# Patient Record
Sex: FEMALE | Race: WHITE | NOT HISPANIC OR LATINO | Employment: FULL TIME | URBAN - METROPOLITAN AREA
[De-identification: names, ages, dates, MRNs, and addresses within clinical notes are randomized per-mention and may not be internally consistent; named-entity substitution may affect disease eponyms.]

---

## 2018-01-31 ENCOUNTER — APPOINTMENT (EMERGENCY)
Dept: CT IMAGING | Facility: HOSPITAL | Age: 48
End: 2018-01-31
Payer: COMMERCIAL

## 2018-01-31 ENCOUNTER — HOSPITAL ENCOUNTER (EMERGENCY)
Facility: HOSPITAL | Age: 48
Discharge: HOME | End: 2018-01-31
Attending: EMERGENCY MEDICINE
Payer: COMMERCIAL

## 2018-01-31 VITALS
DIASTOLIC BLOOD PRESSURE: 89 MMHG | RESPIRATION RATE: 16 BRPM | SYSTOLIC BLOOD PRESSURE: 149 MMHG | OXYGEN SATURATION: 100 % | TEMPERATURE: 98.2 F | HEART RATE: 75 BPM

## 2018-01-31 DIAGNOSIS — R53.83 FATIGUE: ICD-10-CM

## 2018-01-31 DIAGNOSIS — R51.9 PRESSURE IN HEAD: Primary | ICD-10-CM

## 2018-01-31 DIAGNOSIS — I49.3 SYMPTOMATIC PVCS: ICD-10-CM

## 2018-01-31 LAB
ALBUMIN SERPL BCP-MCNC: 3.8 G/DL (ref 3.5–5)
ALP SERPL-CCNC: 55 U/L (ref 46–116)
ALT SERPL W P-5'-P-CCNC: 20 U/L (ref 12–78)
ANION GAP SERPL CALCULATED.3IONS-SCNC: 7 MMOL/L (ref 4–13)
AST SERPL W P-5'-P-CCNC: 13 U/L (ref 5–45)
BASOPHILS # BLD AUTO: 0.01 THOUSANDS/ΜL (ref 0–0.1)
BASOPHILS NFR BLD AUTO: 0 % (ref 0–1)
BILIRUB SERPL-MCNC: 0.5 MG/DL (ref 0.2–1)
BUN SERPL-MCNC: 10 MG/DL (ref 5–25)
CALCIUM SERPL-MCNC: 9.1 MG/DL (ref 8.3–10.1)
CHLORIDE SERPL-SCNC: 106 MMOL/L (ref 100–108)
CO2 SERPL-SCNC: 28 MMOL/L (ref 21–32)
CREAT SERPL-MCNC: 0.94 MG/DL (ref 0.6–1.3)
EOSINOPHIL # BLD AUTO: 0.05 THOUSAND/ΜL (ref 0–0.61)
EOSINOPHIL NFR BLD AUTO: 1 % (ref 0–6)
ERYTHROCYTE [DISTWIDTH] IN BLOOD BY AUTOMATED COUNT: 12.1 % (ref 11.6–15.1)
GFR SERPL CREATININE-BSD FRML MDRD: 72 ML/MIN/1.73SQ M
GLUCOSE SERPL-MCNC: 95 MG/DL (ref 65–140)
HCT VFR BLD AUTO: 41.2 % (ref 34.8–46.1)
HGB BLD-MCNC: 14.2 G/DL (ref 11.5–15.4)
LYMPHOCYTES # BLD AUTO: 1.65 THOUSANDS/ΜL (ref 0.6–4.47)
LYMPHOCYTES NFR BLD AUTO: 30 % (ref 14–44)
MCH RBC QN AUTO: 30 PG (ref 26.8–34.3)
MCHC RBC AUTO-ENTMCNC: 34.5 G/DL (ref 31.4–37.4)
MCV RBC AUTO: 87 FL (ref 82–98)
MONOCYTES # BLD AUTO: 0.47 THOUSAND/ΜL (ref 0.17–1.22)
MONOCYTES NFR BLD AUTO: 9 % (ref 4–12)
NEUTROPHILS # BLD AUTO: 3.33 THOUSANDS/ΜL (ref 1.85–7.62)
NEUTS SEG NFR BLD AUTO: 60 % (ref 43–75)
PLATELET # BLD AUTO: 191 THOUSANDS/UL (ref 149–390)
PMV BLD AUTO: 10.1 FL (ref 8.9–12.7)
POTASSIUM SERPL-SCNC: 4.5 MMOL/L (ref 3.5–5.3)
PROT SERPL-MCNC: 7.5 G/DL (ref 6.4–8.2)
RBC # BLD AUTO: 4.74 MILLION/UL (ref 3.81–5.12)
SODIUM SERPL-SCNC: 141 MMOL/L (ref 136–145)
TROPONIN I SERPL-MCNC: <0.02 NG/ML
TSH SERPL DL<=0.05 MIU/L-ACNC: 2.42 UIU/ML (ref 0.36–3.74)
WBC # BLD AUTO: 5.51 THOUSAND/UL (ref 4.31–10.16)

## 2018-01-31 PROCEDURE — 70450 CT HEAD/BRAIN W/O DYE: CPT

## 2018-01-31 PROCEDURE — 84484 ASSAY OF TROPONIN QUANT: CPT | Performed by: EMERGENCY MEDICINE

## 2018-01-31 PROCEDURE — 99284 EMERGENCY DEPT VISIT MOD MDM: CPT

## 2018-01-31 PROCEDURE — 85025 COMPLETE CBC W/AUTO DIFF WBC: CPT | Performed by: EMERGENCY MEDICINE

## 2018-01-31 PROCEDURE — 93005 ELECTROCARDIOGRAM TRACING: CPT

## 2018-01-31 PROCEDURE — 80053 COMPREHEN METABOLIC PANEL: CPT | Performed by: EMERGENCY MEDICINE

## 2018-01-31 PROCEDURE — 84443 ASSAY THYROID STIM HORMONE: CPT | Performed by: EMERGENCY MEDICINE

## 2018-01-31 PROCEDURE — 96360 HYDRATION IV INFUSION INIT: CPT

## 2018-01-31 PROCEDURE — 36415 COLL VENOUS BLD VENIPUNCTURE: CPT

## 2018-01-31 RX ADMIN — SODIUM CHLORIDE 1000 ML: 0.9 INJECTION, SOLUTION INTRAVENOUS at 18:27

## 2018-01-31 NOTE — ED NOTES
Patient asymptomatic (not dizzy) with orthostatic blood pressures     [id number] [facility] 28, RN  01/31/18 9820

## 2018-01-31 NOTE — ED PROVIDER NOTES
History  Chief Complaint   Patient presents with    Dizziness     Pt reports lighthead feeling following a head cold last week, reports "lightheaded" feeling started saturday  Pt denies sizziness, and reports occasional palpitations, Pt reports headache and "head pressure" since head cold  49year old female presents for evaluation of feeling lightheaded  She denies feeling a spinning sensation and does not feel like she is going to faint  She has been feeling head pressure and HA since having a "head cold" (nasal congestion/sinus pressure) last week  No fever or chills  She also has been having palpitations  No chest pain  No SOB  NO n/v/d  She reports poor appetite and feels dehydrated  She feels very fatigued but has been getting adequate sleep  No dysuria or hematuria  No numbness or weakness  She was evaluated at urgent care a few days ago for her cold sx and was given cough medicine and prednisone and she was not sure if the lightheadedness/palpitations were secondary to prednisone        History provided by:  Patient and medical records   used: No    Dizziness   Quality:  Lightheadedness  Severity:  Moderate  Onset quality:  Gradual  Timing:  Constant  Progression:  Unchanged  Chronicity:  Recurrent  Context: physical activity and standing up    Relieved by:  Nothing  Worsened by:  Nothing  Ineffective treatments:  None tried  Associated symptoms: headaches and palpitations    Associated symptoms: no blood in stool, no chest pain, no shortness of breath, no tinnitus, no vision changes and no vomiting    Risk factors: new medications (prednisone)    Risk factors: no hx of vertigo        None       Past Medical History:   Diagnosis Date    Low blood pressure        History reviewed  No pertinent surgical history  History reviewed  No pertinent family history  I have reviewed and agree with the history as documented      Social History   Substance Use Topics    Smoking status: Never Smoker    Smokeless tobacco: Not on file    Alcohol use Yes      Comment: SOCIAL        Review of Systems   Constitutional: Positive for fatigue  HENT: Positive for sinus pressure  Negative for tinnitus  Respiratory: Negative for shortness of breath  Cardiovascular: Positive for palpitations  Negative for chest pain  Gastrointestinal: Negative for blood in stool and vomiting  Skin: Positive for pallor  Neurological: Positive for dizziness and headaches  All other systems reviewed and are negative  Physical Exam  ED Triage Vitals [01/31/18 1329]   Temperature Pulse Respirations Blood Pressure SpO2   98 2 °F (36 8 °C) 76 18 138/89 98 %      Temp Source Heart Rate Source Patient Position - Orthostatic VS BP Location FiO2 (%)   Oral Monitor Sitting Left arm --      Pain Score       7           Orthostatic Vital Signs  Vitals:    01/31/18 1828 01/31/18 1829 01/31/18 1830 01/31/18 1942   BP: 134/92 133/85 129/85 149/89   Pulse: 71 72 72 75   Patient Position - Orthostatic VS: Sitting Standing Sitting Lying       Physical Exam   Constitutional: She is oriented to person, place, and time  She appears well-developed and well-nourished  HENT:   Head: Normocephalic  Nose: Nose normal    Mouth/Throat: Oropharynx is clear and moist  No oropharyngeal exudate  Eyes: Conjunctivae and EOM are normal  Pupils are equal, round, and reactive to light  Neck: Normal range of motion  Neck supple  Cardiovascular: Normal rate, regular rhythm, normal heart sounds and intact distal pulses  Occasional extrasystoles are present  No murmur heard  Pulmonary/Chest: Effort normal and breath sounds normal    Abdominal: Soft  Bowel sounds are normal  She exhibits no distension  There is no tenderness  There is no rebound and no guarding  Musculoskeletal: Normal range of motion  She exhibits no edema, tenderness or deformity  Lymphadenopathy:     She has no cervical adenopathy  Neurological: She is alert and oriented to person, place, and time  She has normal strength and normal reflexes  No cranial nerve deficit or sensory deficit  She exhibits normal muscle tone  Coordination and gait normal    Skin: Skin is warm, dry and intact  No rash noted  Psychiatric: She has a normal mood and affect  Her behavior is normal  Judgment and thought content normal        ED Medications  Medications   sodium chloride 0 9 % bolus 1,000 mL (0 mL Intravenous Stopped 1/31/18 1944)       Diagnostic Studies  Results Reviewed     Procedure Component Value Units Date/Time    Troponin I [[id number]]  (Normal) Collected:  01/31/18 1826    Lab Status:  Final result Specimen:  Blood from Arm, Right Updated:  01/31/18 1849     Troponin I <0 02 ng/mL     Narrative:         [employer] Chemistry analyzer 99% cutoff is > 0 04 ng/mL in network labs    o cTnI 99% cutoff is useful only when applied to patients in the clinical setting of myocardial ischemia  o cTnI 99% cutoff should be interpreted in the context of clinical history, ECG findings and possibly cardiac imaging to establish correct diagnosis  o cTnI 99% cutoff may be suggestive but clearly not indicative of a coronary event without the clinical setting of myocardial ischemia  TSH [[id number]]  (Normal) Collected:  01/31/18 1412    Lab Status:  Final result Specimen:  Blood from Arm, Right Updated:  01/31/18 1841     TSH 3RD GENERATON 2 422 uIU/mL     Narrative:         Patients undergoing fluorescein dye angiography may retain small amounts of fluorescein in the body for 48-72 hours post procedure  Samples containing fluorescein can produce falsely depressed TSH values  If the patient had this procedure,a specimen should be resubmitted post fluorescein clearance            The recommended reference ranges for TSH during pregnancy are as follows:  First trimester 0 1 to 2 5 uIU/mL  Second trimester  0 2 to 3 0 uIU/mL  Third trimester 0 3 to 3 0 uIU/m Comprehensive metabolic panel [[id number]] Collected:  01/31/18 1412    Lab Status:  Final result Specimen:  Blood from Arm, Right Updated:  01/31/18 1434     Sodium 141 mmol/L      Potassium 4 5 mmol/L      Chloride 106 mmol/L      CO2 28 mmol/L      Anion Gap 7 mmol/L      BUN 10 mg/dL      Creatinine 0 94 mg/dL      Glucose 95 mg/dL      Calcium 9 1 mg/dL      AST 13 U/L      ALT 20 U/L      Alkaline Phosphatase 55 U/L      Total Protein 7 5 g/dL      Albumin 3 8 g/dL      Total Bilirubin 0 50 mg/dL      eGFR 72 ml/min/1 73sq m     Narrative:         National Kidney Disease Education Program recommendations are as follows:  GFR calculation is accurate only with a steady state creatinine  Chronic Kidney disease less than 60 ml/min/1 73 sq  meters  Kidney failure less than 15 ml/min/1 73 sq  meters  CBC and differential [[id number]]  (Normal) Collected:  01/31/18 1412    Lab Status:  Final result Specimen:  Blood from Arm, Right Updated:  01/31/18 1418     WBC 5 51 Thousand/uL      RBC 4 74 Million/uL      Hemoglobin 14 2 g/dL      Hematocrit 41 2 %      MCV 87 fL      MCH 30 0 pg      MCHC 34 5 g/dL      RDW 12 1 %      MPV 10 1 fL      Platelets 279 Thousands/uL      Neutrophils Relative 60 %      Lymphocytes Relative 30 %      Monocytes Relative 9 %      Eosinophils Relative 1 %      Basophils Relative 0 %      Neutrophils Absolute 3 33 Thousands/µL      Lymphocytes Absolute 1 65 Thousands/µL      Monocytes Absolute 0 47 Thousand/µL      Eosinophils Absolute 0 05 Thousand/µL      Basophils Absolute 0 01 Thousands/µL                  CT head without contrast   Final Result by Edmond Montemayor MD (01/31 1815)      No acute intracranial abnormality           Workstation performed: [id number]                    Procedures  ECG 12 Lead Documentation  Date/Time: 1/31/2018 7:28 PM  Performed by: Shana Rodas  Authorized by: RAMONA PÉREZ     Indications / Diagnosis:  Lightheaded, palpitations  ECG reviewed by me, the ED Provider: yes    Patient location:  ED  Previous ECG:     Previous ECG:  Compared to current    Comparison ECG info:  2016    Similarity:  No change  Interpretation:     Interpretation: non-specific    Rate:     ECG rate:  62    ECG rate assessment: normal    Rhythm:     Rhythm: sinus rhythm    Ectopy:     Ectopy: none    QRS:     QRS axis:  Normal  T waves:     T waves: flattening      Flattening:  V4, V3, V5 and V6  Other findings:     Other findings: LAE             Phone Contacts  ED Phone Contact    ED Course  ED Course                                MDM  Number of Diagnoses or Management Options  Fatigue: new and requires workup  Pressure in head: new and requires workup  Symptomatic PVCs: new and requires workup     Amount and/or Complexity of Data Reviewed  Clinical lab tests: ordered and reviewed  Tests in the radiology section of CPT®: reviewed and ordered  Tests in the medicine section of CPT®: ordered and reviewed  Decide to obtain previous medical records or to obtain history from someone other than the patient: yes  Obtain history from someone other than the patient: yes  Independent visualization of images, tracings, or specimens: yes    Risk of Complications, Morbidity, and/or Mortality  General comments: 27-year-old female with symptomatic PVCs, will refer to Cardiology for Holter monitor and or echo if deemed necessary    Patient with unremarkable workup in the department and normal physical exam   She will follow up with her PCP for further workup of fatigue    Patient Progress  Patient progress: stable    CritCare Time    Disposition  Final diagnoses:   Pressure in head   Symptomatic PVCs   Fatigue     Time reflects when diagnosis was documented in both MDM as applicable and the Disposition within this note     Time User Action Codes Description Comment    1/31/2018  7:20 PM Lupe Lawson Add [R51] Pressure in head     1/31/2018  7:20 PM Lupe Lawson Add [I49 3] Symptomatic PVCs     1/31/2018  7:20 PM [facility], Lupe Add [R53 83] Fatigue       ED Disposition     ED Disposition Condition Comment    Discharge  Rafiq Narvaez discharge to home/self care  Condition at discharge: Stable        Follow-up Information     Follow up With Specialties Details Why [address] [facility] Schedule an appointment as soon as possible for a visit in 1 day For recheck of current symptoms Nataly [address] [place] [address] [phone]        There are no discharge medications for this patient  Outpatient Discharge Orders  Holter monitor - 24 hour   Standing Status: Future Number of Occurrences: 1 Standing Exp   Date: 01/31/22         ED Provider  Electronically Signed by           Luis Angel Link DO  02/03/18 0010

## 2018-02-01 ENCOUNTER — HOSPITAL ENCOUNTER (OUTPATIENT)
Dept: NON INVASIVE DIAGNOSTICS | Facility: HOSPITAL | Age: 48
Discharge: HOME | End: 2018-02-01
Attending: EMERGENCY MEDICINE
Payer: COMMERCIAL

## 2018-02-01 DIAGNOSIS — I49.3 SYMPTOMATIC PVCS: ICD-10-CM

## 2018-02-01 LAB
ATRIAL RATE: 66 BPM
P AXIS: 66 DEGREES
PR INTERVAL: 138 MS
QRS AXIS: 45 DEGREES
QRSD INTERVAL: 70 MS
QT INTERVAL: 402 MS
QTC INTERVAL: 409 MS
T WAVE AXIS: 31 DEGREES
VENTRICULAR RATE: 62 BPM

## 2018-02-01 PROCEDURE — 93226 XTRNL ECG REC<48 HR SCAN A/R: CPT

## 2018-02-01 PROCEDURE — 93225 XTRNL ECG REC<48 HRS REC: CPT

## 2018-02-01 NOTE — DISCHARGE INSTRUCTIONS
Fatigue, Ambulatory Care   GENERAL INFORMATION:   Fatigue  is mental and physical exhaustion that does not get better with rest  It may interfere with your daily activities and can cause extreme sleepiness  Although it is normal to feel tired sometimes, long-term fatigue may be a sign of serious illness  Seek immediate care for the following symptoms:   · Difficulty breathing    · Chest pain  Management and treatment for fatigue includes the following:   · Keep a fatigue diary  to help you find out what makes you feel more or less tired  · Exercise as directed  Ask your healthcare provider about the best exercise plan for you  Even moderate exercise may decrease your fatigue  · Keep a regular schedule  Go to bed at the same time every night and limit naps  · Eat healthy foods  including fruits, vegetables, whole-grain breads, low-fat dairy products, beans, lean meats, and fish  Ask if you need to be on a special diet  · Limit caffeine and alcohol  because they can interfere with your sleep  Women should limit alcohol to 1 drink a day  Men should limit alcohol to 2 drinks a day  A drink of alcohol is 12 ounces of beer, 5 ounces of wine, or 1½ ounces of liquor  Ask your healthcare provider how much caffeine is safe for you  · Do not smoke  Smoking can cause health problems that make you tired  If you smoke, it is never too late to quit  Ask your healthcare provider for information if you need help quitting  Follow up with your healthcare provider as directed:  Write down your questions so you remember to ask them during your visits  CARE AGREEMENT:   You have the right to help plan your care  Learn about your health condition and how it may be treated  Discuss treatment options with your caregivers to decide what care you want to receive  You always have the right to refuse treatment  The above information is an  only   It is not intended as medical advice for individual conditions or treatments  Talk to your doctor, nurse or pharmacist before following any medical regimen to see if it is safe and effective for you  © 2014 [address] is for End User's use only and may not be sold, redistributed or otherwise used for commercial purposes  All illustrations and images included in CareNotes® are the copyrighted property of [employer]  or [employer]  Premature Ventricular Contractions   WHAT YOU NEED TO KNOW:   What are premature ventricular contractions? Premature ventricular contractions (PVCs) are an interruption in your heart rhythm  They are caused by an early signal for your heart to pump  Your risk of PVCs increases when you drink alcohol or caffeine, or if you are fatigued or stressed  It is very important for you to follow up with your healthcare provider so the cause of your PVC can be diagnosed and treated  What are symptoms of PVCs? · A skipped heartbeat     · A fast heartbeat    · Chest pain     · Lightheadedness, dizziness, or faintness    · Tiredness with exercise or activity  How are PVCs diagnosed? Your healthcare provider will ask if you have a family history of heart problems  You may also need one or more of the following:  · An EKG  records your heart rhythm and how fast your heart beats  It is used to check for PVCs  · A Holter monitor  is a device that you wear for a period of time  It records how fast your heart beats, and if it beats in a regular pattern  You may need to wear it up to 72 hours  This will show how frequent your PVCs are during your normal daily activities  · An echocardiogram  (echo) is a type of ultrasound that shows the movement and blood vessels of your heart on a monitor  How are PVCs treated? Your treatment will depend on the cause of your PVC  You may need any of the following:  · Heart medicine  may be given to make your heart beat at a regular rate and rhythm      · Cardiac ablation  is a procedure that is used to treat an abnormal heart rhythm  Ask your healthcare provider for more information  When should I contact my healthcare provider? · You still have symptoms after treatment, or your symptoms worsen  · You have questions or concerns about your condition or care  When should I seek immediate care or call 911? · You have any of the following signs of a heart attack:      ¨ Squeezing, pressure, or pain in your chest that lasts longer than 5 minutes or returns    ¨ Discomfort or pain in your back, neck, jaw, stomach, or arm     ¨ Trouble breathing    ¨ Nausea or vomiting    ¨ Lightheadedness or a sudden cold sweat, especially with chest pain or trouble breathing    CARE AGREEMENT:   You have the right to help plan your care  Learn about your health condition and how it may be treated  Discuss treatment options with your caregivers to decide what care you want to receive  You always have the right to refuse treatment  The above information is an  only  It is not intended as medical advice for individual conditions or treatments  Talk to your doctor, nurse or pharmacist before following any medical regimen to see if it is safe and effective for you  © 2017 [address]  Information is for End User's use only and may not be sold, redistributed or otherwise used for commercial purposes  All illustrations and images included in CareNotes® are the copyrighted property of [employer]  or [employer]

## 2018-02-07 PROBLEM — I95.9 HYPOTENSION: Status: ACTIVE | Noted: 2018-02-07

## 2018-02-07 PROCEDURE — 93227 XTRNL ECG REC<48 HR R&I: CPT | Performed by: INTERNAL MEDICINE

## 2018-02-08 ENCOUNTER — OFFICE VISIT (OUTPATIENT)
Dept: CARDIOLOGY CLINIC | Facility: CLINIC | Age: 48
End: 2018-02-08
Payer: COMMERCIAL

## 2018-02-08 VITALS
HEIGHT: 67 IN | DIASTOLIC BLOOD PRESSURE: 90 MMHG | SYSTOLIC BLOOD PRESSURE: 152 MMHG | WEIGHT: 165.8 LBS | HEART RATE: 72 BPM | BODY MASS INDEX: 26.02 KG/M2

## 2018-02-08 DIAGNOSIS — I49.3 PVC (PREMATURE VENTRICULAR CONTRACTION): ICD-10-CM

## 2018-02-08 DIAGNOSIS — R00.2 PALPITATIONS: Primary | ICD-10-CM

## 2018-02-08 DIAGNOSIS — I95.1 ORTHOSTATIC HYPOTENSION: ICD-10-CM

## 2018-02-08 PROCEDURE — 99204 OFFICE O/P NEW MOD 45 MIN: CPT | Performed by: INTERNAL MEDICINE

## 2018-02-08 RX ORDER — MULTIVIT WITH MINERALS/LUTEIN
1000 TABLET ORAL DAILY
COMMUNITY
End: 2021-02-03

## 2018-02-08 RX ORDER — CHOLECALCIFEROL (VITAMIN D3) 125 MCG
CAPSULE ORAL DAILY
COMMUNITY
Start: 2009-03-02 | End: 2018-10-16 | Stop reason: SDUPTHER

## 2018-02-08 RX ORDER — BUTALBITAL, ACETAMINOPHEN AND CAFFEINE 50; 325; 40 MG/1; MG/1; MG/1
TABLET ORAL
Refills: 0 | COMMUNITY
Start: 2018-02-01

## 2018-02-08 RX ORDER — UREA 10 %
800 LOTION (ML) TOPICAL DAILY
COMMUNITY
End: 2019-03-20 | Stop reason: ALTCHOICE

## 2018-02-08 RX ORDER — IBUPROFEN 200 MG
TABLET ORAL EVERY 6 HOURS PRN
COMMUNITY
End: 2018-04-25

## 2018-02-08 NOTE — PROGRESS NOTES
Consultation - Cardiology     Sugey Soria 49 y o  female MRN: [id number]    Assessment/Plan:    1  Palpitations/PVCs  Does not seem to correlate with symptoms based on Holter  Given her history of possible orthostatic hypotension would not recommend empiric treatment with beta blocker which may worsen her overall symptoms  Will order echo just to ensure no cardiac valvular problems or LV dysfunction, but she reports having had a normal echo approximately 2 years ago in [place]      2  History of orthostatic hypotension  Not currently taking Midodrine  She reports the Midodrine did not really make her feel better in the past in any case, so I will not resume this at this time  Her BP was initially mildly elevated, but on repeat check by myself was normal at 130/80  3  Fatigue  She reports she gets constellation of symptoms of fatigue, headaches, etc about twice a year, unclear cause  TSH, CBC, BMP were normal in ED  Recommended she establish care with PCP in [facility], as previously was seeing a PCP in [place], and have further bloodwork to evaluate for any endocrinologic/rheumatologic problems that may be contributing to symptomatology  HPI: Sugey Soria is a 49y o  year old female with a history of orthostatic hypotension who is referred by Dr Sharda Morris for evaluation of palpitations and dizziness  She was seen in ED on 1/31/18 for palpitations and dizziness, felt to have symptomatic PVCs, although no PVCs seen on EKG done in ED, which was personally reviewed  Holter monitor and cardiology follow up were ordered  TSH, CBC, and BMP were normal      Holter 2/1/18 showed average HR 70 BPM, range 46 (5:46 AM) -125 BPM (8:51 AM)  591 ventricular ectopic beats, mostly PVCs, 9 in bigeminy, comprising 0 7% of total beats  67 supraventricular ectopic beats, comprising 0 1% of total beats, 48 isolated PACs, 14 in atrial couplets  1 atrial run of 5 beats at 6:45 AM  Longest R-R interval was 1 5 seconds   Patient reported frequent symptoms of fluttering in chest and pressure in the head, which did not correlate with any arrhythmias or ectopy  She reports she feels no energy for last two weeks, gets a rushing sensation in her head  She had a head cold 2 weeks ago, and since then she has been wiped out  She was having headaches, but reports she has not had any for last 4 days  No chest pain, she feels a strange sensation in her chest which she is wondering if could be anxiety  She hasn't been able to exercise for last year due to lightheadedness with exercise/activity as well as lack of energy  She was able to walk on treadmill however, twice a week, before she got sick  She does feel mildly short of breath  No orthopnea, uses 1 pillow to sleep  No PND  No LE edema  She gets some numbness in her toes when her feet get cold  She feels like she is eating and drinking a sufficient amount, she is drinking 5 12 oz bottles of water and 2 glasses of Gatorade a day  However, this makes her urinate a lot as well  She hasn't been working consistently for last two weeks due to not feeling well, 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, but has to drive an hour each way so doesn't feel comfortable driving while she is not feeling well  She reports a history of orthostatic hypotension, for which she was on Midodrine, took it for 1-2 years, stopped in 1/17 because her BP normalized, was not orthostatic  She didn't feel it really helped anyway  She was put on Midodrine by a neurologist, never had a tilt table study, so unclear why this was prescribed empirically  Review of Systems:    Review of Systems   Constitution: Positive for malaise/fatigue  Negative for chills, decreased appetite, diaphoresis, fever, weakness, night sweats, weight gain and weight loss  HENT: Negative for ear pain, hearing loss, hoarse voice, nosebleeds, sore throat and tinnitus  Eyes: Negative for blurred vision and pain     Cardiovascular: Positive for palpitations  Negative for chest pain, claudication, cyanosis, dyspnea on exertion, irregular heartbeat, leg swelling, near-syncope, orthopnea, paroxysmal nocturnal dyspnea and syncope  Respiratory: Positive for cough, shortness of breath and snoring  Negative for hemoptysis, sleep disturbances due to breathing, sputum production and wheezing  Hematologic/Lymphatic: Negative for adenopathy and bleeding problem  Does not bruise/bleed easily  Skin: Negative for color change, dry skin, flushing, itching, poor wound healing and rash  Musculoskeletal: Negative for arthritis, back pain, falls, joint pain, muscle cramps, muscle weakness, myalgias and neck pain  Gastrointestinal: Positive for constipation  Negative for abdominal pain, diarrhea, dysphagia, heartburn, hematemesis, hematochezia, melena, nausea and vomiting  Genitourinary: Negative for dysuria, frequency, hematuria, hesitancy, non-menstrual bleeding and urgency  Neurological: Positive for excessive daytime sleepiness, dizziness and headaches  Negative for focal weakness, light-headedness, loss of balance, numbness, paresthesias, tremors and vertigo  Psychiatric/Behavioral: Negative for altered mental status, depression and memory loss  The patient is nervous/anxious  The patient does not have insomnia  Allergic/Immunologic: Negative for environmental allergies and persistent infections           Active Problems:     Patient Active Problem List   Diagnosis    Hypotension    Palpitations       Historical Information   Past Medical History:   Diagnosis Date    Low blood pressure     Migraines      Past Surgical History:   Procedure Laterality Date    NO PAST SURGERIES       History   Alcohol Use    Yes     Comment: SOCIAL     History   Drug Use No     History   Smoking Status    Never Smoker   Smokeless Tobacco    Never Used       Family History:   Family History   Problem Relation Age of Onset    Kidney cancer Mother    Price Mcelory Hyperlipidemia Mother     Heart attack Maternal Grandmother     Transient ischemic attack Maternal Grandmother     Kidney cancer Maternal Grandfather     Colon cancer Maternal Grandfather     Leukemia Paternal Grandfather     Hypertension Father     Hyperlipidemia Father     Hypertension Brother     Stroke Brother        Allergies   Allergen Reactions    Penicillins Rash         Current Outpatient Prescriptions:     Ascorbic Acid (VITAMIN C) 1000 MG tablet, Take 1,000 mg by mouth daily, Disp: , Rfl:     butalbital-acetaminophen-caffeine (FIORICET,ESGIC) -40 mg per tablet, TAKE 1 TABLET BY MOUTH EVERY 6 HOURS AS NEEDED FOR HEADACHE, Disp: , Rfl: 0    Calcium Carbonate-Vitamin D (CALCIUM 500 + D PO), Take by mouth, Disp: , Rfl:     Cholecalciferol (VITAMIN D3) 5000 units TABS, Take 5,000 Units by mouth daily, Disp: , Rfl:     cyanocobalamin (VITAMIN B-12) 500 mcg tablet, Take by mouth daily, Disp: , Rfl:     folic acid (FOLVITE) 469 MCG tablet, Take 800 mcg by mouth daily, Disp: , Rfl:     ibuprofen (MOTRIN) 200 mg tablet, Take by mouth every 6 (six) hours as needed for mild pain, Disp: , Rfl:     QUERCETIN PO, Take by mouth, Disp: , Rfl:     Objective   Vitals:   Vitals:    02/08/18 1045   BP: 152/90   BP Location: Right arm   Patient Position: Sitting   Cuff Size: Standard   Pulse: 72   Weight: 75 2 kg (165 lb 12 8 oz)   Height: 5' 7" (1 702 m)       Physical Exam:     GEN: Alert and oriented x 3, in no acute distress  Well appearing and well nourished  HEENT: Sclera anicteric, conjunctivae pink, mucous membranes moist  Oropharynx clear  NECK: Supple, no carotid bruits, no significant JVD  Trachea midline, no thyromegaly  HEART: Regular rhythm, normal S1 and S2, no murmurs, clicks, gallops or rubs  PMI nondisplaced, no thrills  LUNGS: Clear to auscultation bilaterally; no wheezes, rales, or rhonchi  No increased work of breathing or signs of respiratory distress     ABDOMEN: Soft, nontender, nondistended, normoactive bowel sounds  EXTREMITIES: Skin warm and well perfused, no clubbing, cyanosis, or edema  NEURO: No focal findings  Normal gait  Normal speech  Mood and affect normal    SKIN: Normal without suspicious lesions on exposed skin      Lab Results:     No results found for: HGBA1C  No results found for: CHOL  No results found for: HDL  No results found for: LDLCALC  No results found for: TRIG  No components found for: CHOLHDL

## 2018-02-13 ENCOUNTER — TRANSCRIBE ORDERS (OUTPATIENT)
Dept: LAB | Facility: CLINIC | Age: 48
End: 2018-02-13

## 2018-02-13 ENCOUNTER — HOSPITAL ENCOUNTER (OUTPATIENT)
Dept: NON INVASIVE DIAGNOSTICS | Facility: CLINIC | Age: 48
Discharge: HOME | End: 2018-02-13
Payer: COMMERCIAL

## 2018-02-13 PROCEDURE — 93306 TTE W/DOPPLER COMPLETE: CPT

## 2018-02-13 PROCEDURE — 93306 TTE W/DOPPLER COMPLETE: CPT | Performed by: INTERNAL MEDICINE

## 2018-02-14 ENCOUNTER — OFFICE VISIT (OUTPATIENT)
Dept: FAMILY MEDICINE CLINIC | Facility: CLINIC | Age: 48
End: 2018-02-14
Payer: COMMERCIAL

## 2018-02-14 ENCOUNTER — LAB (OUTPATIENT)
Dept: LAB | Facility: CLINIC | Age: 48
End: 2018-02-14
Payer: COMMERCIAL

## 2018-02-14 VITALS
RESPIRATION RATE: 16 BRPM | WEIGHT: 167 LBS | BODY MASS INDEX: 26.21 KG/M2 | OXYGEN SATURATION: 98 % | HEIGHT: 67 IN | DIASTOLIC BLOOD PRESSURE: 78 MMHG | HEART RATE: 74 BPM | SYSTOLIC BLOOD PRESSURE: 140 MMHG

## 2018-02-14 DIAGNOSIS — F41.1 GAD (GENERALIZED ANXIETY DISORDER): ICD-10-CM

## 2018-02-14 DIAGNOSIS — R00.2 PALPITATIONS: Primary | ICD-10-CM

## 2018-02-14 DIAGNOSIS — R00.2 PALPITATIONS: ICD-10-CM

## 2018-02-14 DIAGNOSIS — Z76.89 ENCOUNTER TO ESTABLISH CARE: ICD-10-CM

## 2018-02-14 PROBLEM — G43.009 MIGRAINE WITHOUT AURA: Status: ACTIVE | Noted: 2018-02-14

## 2018-02-14 PROCEDURE — 99204 OFFICE O/P NEW MOD 45 MIN: CPT | Performed by: FAMILY MEDICINE

## 2018-02-14 RX ORDER — ESCITALOPRAM OXALATE 10 MG/1
10 TABLET ORAL DAILY
Qty: 30 TABLET | Refills: 0 | Status: SHIPPED | OUTPATIENT
Start: 2018-02-14 | End: 2018-09-17 | Stop reason: SDUPTHER

## 2018-02-14 NOTE — PROGRESS NOTES
Assessment/Plan:    No problem-specific Assessment & Plan notes found for this encounter  Diagnoses and all orders for this visit:    Palpitations  -     Labs: TARIK screen with reflex titer/pattern, ESR, CRP, RF screen with reflex to titer, CBC, TSH, Vit D  -     Ambulatory referral to Cardiology (dr Denis Wetzel) for a second opinion - t/c getting a 72hr Holter or possibly even a Loop Recorder     - advised to get records from former PCP and Cardiologist in [address] [place] [address] in 1month for f/u     DODIE (generalized anxiety disorder)  -     Restarted on escitalopram (LEXAPRO) 10 mg tablet; Take 1 tablet (10 mg total) by mouth daily  - t/c starting therapy with Toño Hogan - pt will think about it     Encounter to establish care        Subjective:      Patient ID: Trinity Deal is a 49 y o  female  Trinity Deal is a 49 y o  female who presents with her Mother to the office to establish care and for an annual exam  - prior PCP: Dr Brayden Genao ([facility])   - PMHx: Hypotesion, Migraine/Lightheadedness, Palpitations and PVCs, Anemia, DODIE    - allergies: PCN - rash   - Meds: Fiorecet PRN, Folic Acid, Vit I55, Vit C, Vit D, Quercetin supplements   - PSHx: wisdom teeth   - Fhx: M(HL, renal/ureter ca), F (HL, HTN, thyroid disease), B (HTN, stroke at 39), MGF (renal ca), P/MGM (TIA), PGF (leukemia); no Fhx of autoimmune disorders   - Immunizations: does not get Flu vaccines, last Tdap 8/2016   - GYN Hx: last PAP last year (denies h/o abnml PAPs) - has an appt with ob/gyn next week; [facility] 2/10/2018 - gets Q3wks (is a change, used to get Q4wks   )   - Hm:  last Mammo was last year  - diet/exercise:   - social: denies Tob/illicits, social EtOH; works with contracts for [employer]   - sexual Hx: sexually active with wife, no BC   - ROS: today in the office pt denies F/C/N/V/HA/visual changes/SOB/wheezing/abd pain/D/numbness or tingling in b/l UE+LE/LE edema or calf tenderness  2) Palpitations   - (+) intermittent palpitations, "head-rush"/lightheadedness (never passed out), fatigue/no energy  - of note getting over a cold from 2wks ago (per Mom, had [place] when 15yo and since then it takes her "longer than the average person to get better")   - went to [facility]TC 1/31/2018 for eval of palpitations/dizziness with symptomatic PVCs - TSH, CBC, BMP and troponin all within normal limits   - went to see Cardio (dr Xavier Goodwin)   - had a Holter - per Cardio c/s: her PVCs do "not seem to correlate with symptoms based on Holter" and "given h/o orthostatic hypotension would not treat empirically with BB"    - had an ECHO (nml)   - had a similar s/s and w/u 2yrs ago in [place] - all nml   - has had 2 other episodes of this all happening in Jan/Feb - no major life changes/stressors in life; no traumatic event in Jan/Feb (family deaths etc)  - ROS: (+) L-sided neck pain and big toe in b/l feet get cold/numb and that numbness lasts ~1hr   - no Fhx of autoimmune disorders   - [facility] - gets Q[facility] - says feels these s/s sometimes happen before her menses but not monthly - last episode of this was this time last year   - of note, does have a PMHx of DODIE and was on Lexapro 10mg which she self-weaned off in August 2017   - drinks 1 cup of coffee daily, no tea, has cut down on her chocolate intake  - stays adequately hydrated with water/gatorade; eating healthy  - stressed/anxious bc has missed/missing work and bc there has been no progress in making a diagnosis (also of note, pt hasn't followed up with any of her [place] doctors - ie didn't tell doctor that she self-weaned off Lexapro etc)            The following portions of the patient's history were reviewed and updated as appropriate: allergies, current medications, past family history, past medical history, past social history, past surgical history and problem list     Review of Systems  as per HPI     Objective:    Vitals:    02/14/18 1255   BP: 140/78   Pulse: 74   Resp: 16 SpO2: 98%        Physical Exam   Constitutional: She is oriented to person, place, and time  She appears well-developed and well-nourished  No distress  HENT:   Head: Normocephalic and atraumatic  Nose: Nose normal    Eyes: Conjunctivae and EOM are normal  Pupils are equal, round, and reactive to light  Right eye exhibits no discharge  Left eye exhibits no discharge  No scleral icterus  Neck: Normal range of motion  Neck supple  No tracheal deviation present  No thyromegaly present  Cardiovascular: Normal rate, regular rhythm and normal heart sounds  Exam reveals no gallop and no friction rub  No murmur heard  Felt a flutter on palpitation    Pulmonary/Chest: Effort normal and breath sounds normal  No stridor  No respiratory distress  She has no wheezes  She has no rales  She exhibits no tenderness  Abdominal: Soft  Bowel sounds are normal  She exhibits no distension and no mass  There is no tenderness  There is no rebound and no guarding  Musculoskeletal: Normal range of motion  She exhibits no edema or tenderness  Lymphadenopathy:     She has no cervical adenopathy  Neurological: She is alert and oriented to person, place, and time  Skin: Skin is warm  No rash noted  She is not diaphoretic  No erythema  Psychiatric: Her speech is normal and behavior is normal  Judgment and thought content normal  Her mood appears anxious   Cognition and memory are normal

## 2018-02-22 LAB
25(OH)D3+25(OH)D2 SERPL-MCNC: 38.1 NG/ML (ref 30–100)
ANA SER QL: NEGATIVE
ANA TITR SER IF: NEGATIVE {TITER}
CCP IGA+IGG SERPL IA-ACNC: 5 UNITS (ref 0–19)
CRP SERPL-MCNC: 0.4 MG/L (ref 0–4.9)
ERYTHROCYTE [SEDIMENTATION RATE] IN BLOOD BY WESTERGREN METHOD: 6 MM/HR (ref 0–32)
RHEUMATOID FACT SERPL-ACNC: <10 IU/ML (ref 0–13.9)

## 2018-02-23 DIAGNOSIS — R53.83 FATIGUE, UNSPECIFIED TYPE: Primary | ICD-10-CM

## 2018-02-23 DIAGNOSIS — R53.1 WEAKNESS GENERALIZED: ICD-10-CM

## 2018-03-14 ENCOUNTER — OFFICE VISIT (OUTPATIENT)
Dept: FAMILY MEDICINE CLINIC | Facility: CLINIC | Age: 48
End: 2018-03-14
Payer: COMMERCIAL

## 2018-03-14 VITALS
OXYGEN SATURATION: 98 % | DIASTOLIC BLOOD PRESSURE: 68 MMHG | HEIGHT: 67 IN | SYSTOLIC BLOOD PRESSURE: 122 MMHG | BODY MASS INDEX: 26.06 KG/M2 | RESPIRATION RATE: 16 BRPM | WEIGHT: 166 LBS | HEART RATE: 74 BPM

## 2018-03-14 DIAGNOSIS — R42 INTERMITTENT LIGHTHEADEDNESS: ICD-10-CM

## 2018-03-14 DIAGNOSIS — R53.1 WEAKNESS GENERALIZED: Primary | ICD-10-CM

## 2018-03-14 DIAGNOSIS — R00.2 PALPITATIONS: ICD-10-CM

## 2018-03-14 DIAGNOSIS — F41.1 GAD (GENERALIZED ANXIETY DISORDER): ICD-10-CM

## 2018-03-14 DIAGNOSIS — G43.009 MIGRAINE WITHOUT AURA AND WITHOUT STATUS MIGRAINOSUS, NOT INTRACTABLE: ICD-10-CM

## 2018-03-14 PROCEDURE — 99214 OFFICE O/P EST MOD 30 MIN: CPT | Performed by: FAMILY MEDICINE

## 2018-03-14 NOTE — PROGRESS NOTES
Assessment/Plan:    No problem-specific Assessment & Plan notes found for this encounter  Diagnoses and all orders for this visit:    - Weakness generalized  -     Ambulatory referral to Endocrinology  - Palpitations  -     Cardiac event monitor; Future  - DODIE (generalized anxiety disorder)  - Migraine without aura and without status migrainosus, not intractable  - Intermittent lightheadedness  - Lyme titer pending   - t/c switching Lexapro to Cymbalta - ?diagnosis of Fibromyalgia   - did speak with Cardio (Dr Michael Haji) and he recommended monitoring the patient with a 1wk or 2wk (prefered) cardiac event monitor and then to have her f/u in his office -- LM with [facility]  to assist in scheduling an appt for the patient  - d/w pt to talk to her OB and t/c starting an OCP if her OB thinks that this lightheadedness/migraine/generalized weakness is due to her menses/the beginning of menopause   - prior records pending   - RTO in 1month for f/u - pt and family aware and agreeable         Subjective:      Patient ID: Rafiq Narvaez is a 49 y o  female      45yo F presents to the office with her Mother and Wife for f/u of palpitations  - was 1st seen in the office for eval of palpitations/dizziness with symptomatic PVC about 1month ago after being evaluated in the [address] [place] [address] and having a Cardiology follow-up   - in the ETC her TSH, CBC, BMP and troponin were all within normal limits  - she had a 24hr Holter placed by Cards but after review it was noted in the Cardio c/s note that pt's PVCs did "not seem to correlate with symptoms based on Holter" and "given h/o orthostatic hypotension would not treat empirically with BB"    - she had an ECHO which was nml   - she has had similar s/s with w/u 2yrs ago in [place] - initial work-up all nml  - has had 2 other episodes of this all happening in Jan/Feb - no major life changes/stressors in life; no traumatic event in Jan/Feb (family deaths etc)  - no Fhx of autoimmune disorders   - states that sometimes this can be a/w her menses -- lightheaded/migraine without aura/generalized weakness/fatigue   - does have a PMHx of DODIE and was started on Lexapro 10mg QD at last OV (she has been on this prior and had self-weaned off in August 2017)   - drinks 1 cup of coffee daily, no tea, has cut down on her chocolate intake  - stays adequately hydrated with water/gatorade; no real appetite but has been eating bland foods   - stressed/anxious bc has missed/missing work and bc there has been no progress in making a diagnosis (also of note, pt hasn't followed up with any of her [place] doctors - ie didn't tell doctor that she self-weaned off Lexapro etc)    - had Rheum labs (TARIK, Rf, CRP, ESR, VitD) all within normal limits  - has a Lyme Titer pending  - today in the office with little to no energy, feels dizzy/groggy/weak; ROS denies F/C/N/V/CP/SOB/abd pain/D/C  - did have an appt with her OB who attributes her s/s to her menses/beginning of menopause       The following portions of the patient's history were reviewed and updated as appropriate: allergies, current medications, past family history, past medical history, past social history, past surgical history and problem list     Review of Systems  as per HPI     Objective:    /68 (BP Location: Left arm, Patient Position: Sitting, Cuff Size: Standard)   Pulse 74   Resp 16   Ht 5' 7" (1 702 m)   Wt 75 3 kg (166 lb)   SpO2 98%   BMI 26 00 kg/m²      Physical Exam   Constitutional: She is oriented to person, place, and time  She appears well-developed and well-nourished  No distress  Fatigued, was laying down on the examination table and required assistance to sit up   HENT:   Head: Normocephalic and atraumatic  Nose: Nose normal    Eyes: Conjunctivae and EOM are normal  Right eye exhibits no discharge  Left eye exhibits no discharge  No scleral icterus  Neck: Normal range of motion     Cardiovascular: Normal rate, regular rhythm and normal heart sounds  Exam reveals no gallop and no friction rub  No murmur heard  Pulmonary/Chest: Effort normal and breath sounds normal  No stridor  No respiratory distress  She has no wheezes  She has no rales  She exhibits no tenderness  Abdominal: Soft  Bowel sounds are normal  She exhibits no distension  Musculoskeletal: Normal range of motion  She exhibits no edema, tenderness or deformity  Lymphadenopathy:     She has no cervical adenopathy  Neurological: She is alert and oriented to person, place, and time  Skin: Skin is warm  She is not diaphoretic  Psychiatric: Her speech is normal and behavior is normal  Judgment and thought content normal  Her mood appears anxious   Cognition and memory are normal

## 2018-03-15 LAB
B BURGDOR IGG+IGM SER-ACNC: <0.91 ISR (ref 0–0.9)
B BURGDOR IGM SER IA-ACNC: <0.8 INDEX (ref 0–0.79)
BASOPHILS # BLD AUTO: 0 X10E3/UL (ref 0–0.2)
BASOPHILS NFR BLD AUTO: 0 %
EOSINOPHIL # BLD AUTO: 0.1 X10E3/UL (ref 0–0.4)
EOSINOPHIL NFR BLD AUTO: 1 %
ERYTHROCYTE [DISTWIDTH] IN BLOOD BY AUTOMATED COUNT: 13.6 % (ref 12.3–15.4)
HCT VFR BLD AUTO: 41.8 % (ref 34–46.6)
HGB BLD-MCNC: 14 G/DL (ref 11.1–15.9)
IMM GRANULOCYTES # BLD: 0 X10E3/UL (ref 0–0.1)
IMM GRANULOCYTES NFR BLD: 0 %
LYMPHOCYTES # BLD AUTO: 1.3 X10E3/UL (ref 0.7–3.1)
LYMPHOCYTES NFR BLD AUTO: 28 %
MCH RBC QN AUTO: 30.8 PG (ref 26.6–33)
MCHC RBC AUTO-ENTMCNC: 33.5 G/DL (ref 31.5–35.7)
MCV RBC AUTO: 92 FL (ref 79–97)
MONOCYTES # BLD AUTO: 0.4 X10E3/UL (ref 0.1–0.9)
MONOCYTES NFR BLD AUTO: 8 %
NEUTROPHILS # BLD AUTO: 2.9 X10E3/UL (ref 1.4–7)
NEUTROPHILS NFR BLD AUTO: 63 %
PLATELET # BLD AUTO: 203 X10E3/UL (ref 150–379)
RBC # BLD AUTO: 4.55 X10E6/UL (ref 3.77–5.28)
TSH SERPL DL<=0.005 MIU/L-ACNC: 2.72 UIU/ML (ref 0.45–4.5)
WBC # BLD AUTO: 4.6 X10E3/UL (ref 3.4–10.8)

## 2018-04-24 RX ORDER — NORETHINDRONE ACETATE AND ETHINYL ESTRADIOL, ETHINYL ESTRADIOL AND FERROUS FUMARATE 1MG-10(24)
KIT ORAL
Refills: 6 | COMMUNITY
Start: 2018-03-16 | End: 2018-12-12

## 2018-04-25 ENCOUNTER — OFFICE VISIT (OUTPATIENT)
Dept: FAMILY MEDICINE CLINIC | Facility: CLINIC | Age: 48
End: 2018-04-25
Payer: COMMERCIAL

## 2018-04-25 VITALS
SYSTOLIC BLOOD PRESSURE: 128 MMHG | OXYGEN SATURATION: 98 % | DIASTOLIC BLOOD PRESSURE: 70 MMHG | WEIGHT: 160 LBS | HEIGHT: 67 IN | HEART RATE: 65 BPM | RESPIRATION RATE: 16 BRPM | BODY MASS INDEX: 25.11 KG/M2

## 2018-04-25 DIAGNOSIS — R00.2 PALPITATIONS: Primary | ICD-10-CM

## 2018-04-25 DIAGNOSIS — I49.3 PVC (PREMATURE VENTRICULAR CONTRACTION): ICD-10-CM

## 2018-04-25 DIAGNOSIS — F41.9 ANXIETY AND DEPRESSION: ICD-10-CM

## 2018-04-25 DIAGNOSIS — F32.A ANXIETY AND DEPRESSION: ICD-10-CM

## 2018-04-25 PROCEDURE — 99213 OFFICE O/P EST LOW 20 MIN: CPT | Performed by: FAMILY MEDICINE

## 2018-04-25 RX ORDER — MAGNESIUM OXIDE/MAG AA CHELATE 133 MG
1 TABLET ORAL 2 TIMES DAILY
COMMUNITY

## 2018-04-25 NOTE — PROGRESS NOTES
Assessment/Plan:    No problem-specific Assessment & Plan notes found for this encounter  Diagnoses and all orders for this visit:  Palpitations  PVC (premature ventricular contraction)  Anxiety and depression  - doing much better - feels more energized  - did have a Cardio c/s with Dr Kiet Chairez who did not think her s/s were cardiogenic in nature   - cont Lexapro 10mg QD  - advised to start her OCPs as prescribed by her OB - LO LOESTRIN FE 1 MG-10 MCG / 10 MCG TABS; 1 TABLET(S) PO DAILY  - referred to in-house interim therapist as on the verge of a break-through with respect to figuring out the root of her s/s -- deaths in the family around the  [employer], doing extensive house-work in Dec/ after getting back her tax refunds, exposure to chemicals while cleaning/renovating her house   - RTO in 3months for f/u - pt aware and agreeable         Subjective:    Patient ID: Chris Smith is a 49 y o  female    47yo F presents to the office for f/u of generalized weakness/palpitations  - doing significantly better  - did see Cardio (Dr Kite Chairez) for a 2nd opinion - was unable to get a Holter prior to OV - but per Cardio c/s - doubt this is cardiac in nature   - saw OB in March and starting OCP this week  - tolerating Lexapro 10mg QD well and feels like its helping relieve her palpitations/anxiety  - has been trying to think of inciting factors and notes that 3 grandparents had  around the  [employer] (starting in Nov) and that's when she tends to start feeling down/sad   - also mentions that tends to do a lot of house-hold/renovating projects around  after getting her tax returns - could have been exposed to some chemicals as did have chemical burns on her hands from redoing her bathroom (did not wear gloves or a mask)   - has switched all of her bath and body products to chemical free agents and has noticed a difference  - feels more energetic   - has started taking magnesium supplements   - is still worried that this could be a variant of Lyme disease and is curious about a Rheum referral   - today in the office pt denies F/C/N/V/HA/lightheadedness/generalized weakness/CP/palpitations/SOB/wheezing/abd pain/LE edema      The following portions of the patient's history were reviewed and updated as appropriate: allergies, current medications, past family history, past medical history, past social history, past surgical history and problem list     Review of Systems  as per HPI     Objective:  /70 (BP Location: Left arm, Patient Position: Sitting, Cuff Size: Standard)   Pulse 65   Resp 16   Ht 5' 7" (1 702 m)   Wt 72 6 kg (160 lb)   SpO2 98%   BMI 25 06 kg/m²    Physical Exam   Constitutional: She is oriented to person, place, and time  She appears well-developed and well-nourished  No distress  Energized, smiling, interactive and not anxious    HENT:   Head: Normocephalic and atraumatic  Nose: Nose normal    Eyes: Conjunctivae and EOM are normal  Right eye exhibits no discharge  Left eye exhibits no discharge  No scleral icterus  Neck: Normal range of motion  No tracheal deviation present  Cardiovascular: Normal rate, regular rhythm and normal heart sounds  Exam reveals no gallop and no friction rub  No murmur heard  Pulmonary/Chest: Effort normal and breath sounds normal  No stridor  No respiratory distress  She has no wheezes  Abdominal: Soft  Bowel sounds are normal    Musculoskeletal: Normal range of motion  She exhibits no deformity  Neurological: She is alert and oriented to person, place, and time  Skin: Skin is warm and dry  No rash noted  She is not diaphoretic  No erythema  No pallor  Psychiatric: She has a normal mood and affect  Her behavior is normal  Judgment and thought content normal    Vitals reviewed

## 2018-07-25 ENCOUNTER — OFFICE VISIT (OUTPATIENT)
Dept: FAMILY MEDICINE CLINIC | Facility: CLINIC | Age: 48
End: 2018-07-25
Payer: COMMERCIAL

## 2018-07-25 VITALS
HEIGHT: 67 IN | HEART RATE: 70 BPM | DIASTOLIC BLOOD PRESSURE: 78 MMHG | RESPIRATION RATE: 16 BRPM | BODY MASS INDEX: 26.37 KG/M2 | WEIGHT: 168 LBS | SYSTOLIC BLOOD PRESSURE: 122 MMHG | OXYGEN SATURATION: 98 %

## 2018-07-25 DIAGNOSIS — M25.521 RIGHT ELBOW PAIN: ICD-10-CM

## 2018-07-25 DIAGNOSIS — I49.8 POTS (POSTURAL ORTHOSTATIC TACHYCARDIA SYNDROME): Primary | ICD-10-CM

## 2018-07-25 PROCEDURE — 3008F BODY MASS INDEX DOCD: CPT | Performed by: FAMILY MEDICINE

## 2018-07-25 PROCEDURE — 99214 OFFICE O/P EST MOD 30 MIN: CPT | Performed by: FAMILY MEDICINE

## 2018-07-25 RX ORDER — NAPROXEN 500 MG/1
500 TABLET ORAL 2 TIMES DAILY WITH MEALS
Qty: 28 TABLET | Refills: 0 | Status: SHIPPED | OUTPATIENT
Start: 2018-07-25 | End: 2018-09-24

## 2018-07-25 NOTE — PROGRESS NOTES
Assessment/Plan:  No problem-specific Assessment & Plan notes found for this encounter  Diagnoses and all orders for this visit:  POTS (postural orthostatic tachycardia syndrome)  - s/s could be due to POTS  - has been to 2 separate Cardiologists who do not feel that her s/s are cardiogenic in nature   - cont OCPs and Lexapro 10mg QD   - advised to get formal tilt table test done and will refer to Neuro   - in the meantime encouraged hydration, adding salt to diet and daily exercise   - RTO in 3months for f/u   -  Tilt table; Future  -     Ambulatory referral to Neurology; Future    Right elbow pain  -     naproxen (NAPROSYN) 500 mg tablet; Take 1 tablet (500 mg total) by mouth 2 (two) times a day with meals  - will touch base over phone in 2wks - if not better, t/c Ortho referral for joint injection - pt aware         Subjective:    Patient ID: Chris Kamara is a 51 y o  female    51yo F presents to the office for f/u   1) POTS?  - has concerns that she may have POTS - has been doing some research online and thinks her s/s (fatigue, weakness, sweating, dizziness/lightheadedness, tachycardia, anxiety) correlate with that dx   - did see Cardio (Dr Artemio Boswell) for a 2nd opinion - was unable to get a Holter prior to OV - but per Cardio c/s - doubt this is cardiac in nature   - saw OB in March and currently on OCPs  - tolerating Lexapro 10mg QD well and feels like its helping relieve her palpitations/anxiety  - [facility] 7/21 - started when went camping this past weekend  - had a migraine Tuesday - had to leave work early - took [id number] and then a repeat dose - threw up 2/2 pain   - missed Endo appt earlier today   - has been trying to stay hydrated and add more salt to her diet   - BP in the office within normal limits  - has never had a tilt table test before   2) b/l elbow pain  - has noticed b/l elbow pain   - has been doing a lot of yard/wood work and lifting heavy objects   - numbness in her R-hand - numbness of her ring and pinky finger  - has not tried anything for this   - in the past has received steroid injections in her elbow to alleviate pain and prefers to avoid having to do that again       The following portions of the patient's history were reviewed and updated as appropriate: allergies, current medications, past family history, past medical history, past social history, past surgical history and problem list     Review of Systems  as per HPI     Objective:  /78 (BP Location: Left arm, Patient Position: Sitting, Cuff Size: Standard)   Pulse 70   Resp 16   Ht 5' 7" (1 702 m)   Wt 76 2 kg (168 lb)   SpO2 98%   BMI 26 31 kg/m²    Physical Exam   Constitutional: She is oriented to person, place, and time  She appears well-developed and well-nourished  No distress  HENT:   Head: Normocephalic and atraumatic  Nose: Nose normal    Eyes: Conjunctivae are normal  Right eye exhibits no discharge  Left eye exhibits no discharge  No scleral icterus  Neck: Normal range of motion  Neck supple  Cardiovascular: Normal rate, regular rhythm and normal heart sounds  Exam reveals no gallop and no friction rub  No murmur heard  Pulmonary/Chest: Effort normal and breath sounds normal  No respiratory distress  She has no wheezes  She has no rales  Abdominal: Soft  Bowel sounds are normal    Musculoskeletal: Normal range of motion  She exhibits tenderness  She exhibits no edema or deformity  +5/5 UE b/l, no R-elbow pain with supination or pronation, point tenderness at lateral epicondyle and with numbness in her R-ring and pinky finger    Neurological: She is alert and oriented to person, place, and time  Skin: Skin is warm and dry  No rash noted  She is not diaphoretic  No erythema  No pallor  Psychiatric: She has a normal mood and affect  Her behavior is normal  Judgment and thought content normal    Vitals reviewed

## 2018-08-10 ENCOUNTER — HOSPITAL ENCOUNTER (OUTPATIENT)
Dept: NON INVASIVE DIAGNOSTICS | Facility: HOSPITAL | Age: 48
Discharge: HOME | End: 2018-08-10
Payer: COMMERCIAL

## 2018-08-10 DIAGNOSIS — I49.8 POTS (POSTURAL ORTHOSTATIC TACHYCARDIA SYNDROME): ICD-10-CM

## 2018-08-10 PROCEDURE — 93660 TILT TABLE EVALUATION: CPT

## 2018-08-23 PROCEDURE — 93660 TILT TABLE EVALUATION: CPT | Performed by: INTERNAL MEDICINE

## 2018-09-17 DIAGNOSIS — F41.1 GAD (GENERALIZED ANXIETY DISORDER): ICD-10-CM

## 2018-09-17 RX ORDER — ESCITALOPRAM OXALATE 10 MG/1
10 TABLET ORAL DAILY
Qty: 30 TABLET | Refills: 0 | Status: SHIPPED | OUTPATIENT
Start: 2018-09-17 | End: 2018-09-23 | Stop reason: SDUPTHER

## 2018-09-18 DIAGNOSIS — F41.1 GAD (GENERALIZED ANXIETY DISORDER): ICD-10-CM

## 2018-09-18 RX ORDER — ESCITALOPRAM OXALATE 10 MG/1
TABLET ORAL
Qty: 30 TABLET | Refills: 0 | OUTPATIENT
Start: 2018-09-18

## 2018-09-18 NOTE — TELEPHONE ENCOUNTER
Torin Yeager,    Can you cancel this out  This was already taken care of however I cannot close it as I do not have the security to do so

## 2018-09-23 DIAGNOSIS — F41.1 GAD (GENERALIZED ANXIETY DISORDER): ICD-10-CM

## 2018-09-24 RX ORDER — ESCITALOPRAM OXALATE 10 MG/1
TABLET ORAL
Qty: 90 TABLET | Refills: 0 | Status: SHIPPED | OUTPATIENT
Start: 2018-09-24 | End: 2018-12-22 | Stop reason: SDUPTHER

## 2018-10-16 ENCOUNTER — OFFICE VISIT (OUTPATIENT)
Dept: ENDOCRINOLOGY | Facility: CLINIC | Age: 48
End: 2018-10-16
Payer: COMMERCIAL

## 2018-10-16 VITALS
WEIGHT: 175.4 LBS | SYSTOLIC BLOOD PRESSURE: 112 MMHG | DIASTOLIC BLOOD PRESSURE: 90 MMHG | HEART RATE: 66 BPM | HEIGHT: 67 IN | BODY MASS INDEX: 27.53 KG/M2

## 2018-10-16 DIAGNOSIS — R53.82 CHRONIC FATIGUE: Primary | ICD-10-CM

## 2018-10-16 DIAGNOSIS — F41.9 ANXIETY: ICD-10-CM

## 2018-10-16 DIAGNOSIS — R00.2 PALPITATIONS: ICD-10-CM

## 2018-10-16 PROCEDURE — 99244 OFF/OP CNSLTJ NEW/EST MOD 40: CPT | Performed by: INTERNAL MEDICINE

## 2018-10-16 RX ORDER — NORETHINDRONE ACETATE AND ETHINYL ESTRADIOL 1; .02 MG/1; MG/1
1 TABLET ORAL DAILY
Qty: 21 TABLET | Refills: 0 | Status: SHIPPED | OUTPATIENT
Start: 2018-10-16 | End: 2018-12-12

## 2018-10-16 RX ORDER — BIOTIN 10 MG
TABLET ORAL DAILY
COMMUNITY

## 2018-10-16 RX ORDER — DEXAMETHASONE 1 MG
1 TABLET ORAL ONCE
Qty: 1 TABLET | Refills: 0 | Status: SHIPPED | OUTPATIENT
Start: 2018-10-16 | End: 2018-10-17

## 2018-10-16 NOTE — PROGRESS NOTES
Consultation - Vada Denver 51 y o  female MRN: [id number]    Unit/Bed#:  Encounter: [id number]      Assessment/Plan     Assessment: This is a 51y o -year-old female with past medical history of anxiety and depression who presents for evaluation of fatigue,  Lightheadedness and palpitations   Plan:  1  Lightheadedness, palpitations, anxiety  -we will check a m  Cortisol  She clinically has no risk factors for adrenal insufficiency   -given that her symptoms were initially related to her menses and now that she is nearing post menopausal age  I will check for her gonadotropins including FSH, LH and check for prolactin TSH and free T4     - for dysmenorrhea,  discussed with regarding trying different OCP (Junel 1/20 )with slightly more estradiol if her symptoms are nearing post menopausal age  She does not have history of breast cancer or family history of breast cancer, DVT, clotting disorder and is not a current smoker  She understands the risks of of OCPs including CV (heart attacks, stroke, HTN and VTE risk) associated with OCPs and is agreeable to trying a different OCP  -do not think her symptoms are related to pheochromocytoma or hyperaldosteronism as she does not have history of hypertension and or hypokalemia and the symptoms are not episodic in nature  2  Chronic fatigue   - may be related to # 1  - will also assess for Cushing's, suspicion is low as she does not have any Cushingoid features or risk factors for Cushing's  Given symptoms associated of weight gain and fatigue, will r/o Cushing's  - Overnight DST, and check 8 am cortisol level      3  Anxiety - on lexapro per PCP   Consults    CC: lightheadedness  History of Present Illness     HPI: Vada Denver is a 51y o  year old female with anxiety and depression, presents for evaluation of lightheadedness, fatigue and palpitations  She reports this has been on going for 10 years   She reports these episodes are mostly in the morning and lasts usually the entire day  These episodes are associated with palpitations, excessive fatigue, headache, anxiety and she reports they usually use to occur prior to her menses  She was started on OCPs since last 6 months and since then her menses have been irregular  She is currently taking Loestrin and has spotting mostly which is irregular and last 2-3 days  Menarche was at age 11, periods were always regular  No miscarriages or   These episodes have caused her to miss work on multiple occasions  She also had cardiac work up which was negative  She was diagnosed with anxiety and is currently taking lexapro 10mg PO daily for 1 year however these episodes have not changed  She denies any thyroid disorders  She reports history of migraine diagnosed 10 years ago and reports that palpitations and lightheadedness symptoms or similar and she was given Fioricet  Lately though these episodes since January are different than her previous migraine episodes  She denies excessive use of steroids in the past   She denies receiving any steroid injections in the recent past   She does report excessive weight gain of about 30 lb since the last 1-2 years  She reports history of thyroid disease in her father but denies any other endocrinopathies  She history of orthostatic hypotension and was given midodrine however it did not help with her symptoms  Review of Systems   Constitutional: Positive for activity change, fatigue and unexpected weight change  Negative for chills, diaphoresis and fever  HENT: Negative for congestion and facial swelling  Cardiovascular: Positive for palpitations  Gastrointestinal: Negative for abdominal pain and diarrhea  Endocrine: Positive for polydipsia and polyuria  Negative for cold intolerance and heat intolerance  Genitourinary: Negative for dysuria  Musculoskeletal:        Joint pain     Skin: Negative      Neurological: Positive for weakness, light-headedness, numbness and headaches  Psychiatric/Behavioral: The patient is nervous/anxious          Historical Information   Past Medical History:   Diagnosis Date    Anxiety     Dermatitis 02/15/2007    LAST ASSESSED: 2/15/07    Low blood pressure     Migraines      Past Surgical History:   Procedure Laterality Date    NO PAST SURGERIES       Social History   History   Alcohol Use    Yes     Comment: rarely     History   Drug Use No     History   Smoking Status    Never Smoker   Smokeless Tobacco    Never Used     Family History:   Family History   Problem Relation Age of Onset    Kidney cancer Mother 61        [place] [facility]    Hyperlipidemia Mother     Heart attack Maternal Grandmother [de-identified]    Transient ischemic attack Maternal Grandmother     Kidney cancer Maternal Grandfather     Colon cancer Maternal Grandfather [de-identified]    Leukemia Paternal Grandfather     Hypertension Father     Hyperlipidemia Father     Thyroid disease Father     Hypertension Brother     Stroke Brother 40    Transient ischemic attack Paternal Grandmother        Meds/Allergies   Current Outpatient Prescriptions   Medication Sig Dispense Refill    Ascorbic Acid (VITAMIN C) 1000 MG tablet Take 1,000 mg by mouth daily      butalbital-acetaminophen-caffeine (FIORICET,ESGIC) -40 mg per tablet TAKE 1 TABLET BY MOUTH EVERY 6 HOURS AS NEEDED FOR HEADACHE  0    Calcium Carbonate-Vitamin D (CALCIUM 500 + D PO) Take by mouth      Cholecalciferol (VITAMIN D3) 5000 units TABS Take 5,000 Units by mouth daily      Cyanocobalamin (VITAMIN B-12) 3000 MCG SUBL Place under the tongue daily      escitalopram (LEXAPRO) 10 mg tablet TAKE 1 TABLET BY MOUTH DAILY 90 tablet 0    folic acid (FOLVITE) 929 MCG tablet Take 800 mcg by mouth daily      LO LOESTRIN FE 1 MG-10 MCG / 10 MCG TABS 1 TABLET(S) PO DAILY  6    Specialty Vitamins Products (MAGNESIUM, AMINO ACID CHELATE,) 133 MG tablet Take 1 tablet by mouth 2 (two) times a day       No current facility-administered medications for this visit  Allergies   Allergen Reactions    Penicillins Rash       Objective   Vitals: Blood pressure 112/90, pulse 66, height 5' 7" (1 702 m), weight 79 6 kg (175 lb 6 4 oz)  [unfilled]  Invasive Devices          No matching active lines, drains, or airways          Physical Exam   Constitutional: She is oriented to person, place, and time  She appears well-developed and well-nourished  HENT:   Head: Normocephalic and atraumatic  Nose: Nose normal    Mouth/Throat: Oropharynx is clear and moist    Eyes: Pupils are equal, round, and reactive to light  EOM are normal     sclera non injected; no lid lag or stare   Neck: Neck supple  No thyromegaly present  moves well with swallow, no nodularity    Cardiovascular: Normal rate, regular rhythm, normal heart sounds and intact distal pulses  No murmur heard  Pulmonary/Chest: Effort normal and breath sounds normal    Abdominal: Soft  Bowel sounds are normal  She exhibits no distension  There is no tenderness  Musculoskeletal: Normal range of motion  She exhibits no edema  no ulcers,    Lymphadenopathy:     She has no cervical adenopathy  Neurological: She is alert and oriented to person, place, and time  normal DTRs UE and LE, no tremor   Skin: Skin is warm  No rash noted  No erythema  Psychiatric: She has a normal mood and affect  The history was obtained from the review of the chart, patient      Lab Results:       Lab Results   Component Value Date    WBC 4 6 03/13/2018    HGB 14 0 03/13/2018    HCT 41 8 03/13/2018    MCV 92 03/13/2018     03/13/2018     Lab Results   Component Value Date/Time    BUN 10 01/31/2018 02:12 PM     01/31/2018 02:12 PM    K 4 5 01/31/2018 02:12 PM     01/31/2018 02:12 PM    CO2 28 01/31/2018 02:12 PM    CREATININE 0 94 01/31/2018 02:12 PM    AST 13 01/31/2018 02:12 PM    ALT 20 01/31/2018 02:12 PM    ALB 3 8 01/31/2018 02:12 PM     No results for input(s): CHOL, HDL, LDL, TRIG, VLDL in the last 72 hours  No results found for: Slim Concepcion  No results found for: POCGLU    Imaging Studies: I have personally reviewed pertinent reports  Portions of the record may have been created with voice recognition software

## 2018-10-17 ENCOUNTER — OFFICE VISIT (OUTPATIENT)
Dept: FAMILY MEDICINE CLINIC | Facility: CLINIC | Age: 48
End: 2018-10-17
Payer: COMMERCIAL

## 2018-10-17 DIAGNOSIS — R53.1 WEAKNESS GENERALIZED: ICD-10-CM

## 2018-10-17 DIAGNOSIS — Z13.1 SCREENING FOR DIABETES MELLITUS: ICD-10-CM

## 2018-10-17 DIAGNOSIS — Z13.220 SCREENING CHOLESTEROL LEVEL: ICD-10-CM

## 2018-10-17 DIAGNOSIS — Z13.0 SCREENING, ANEMIA, DEFICIENCY, IRON: ICD-10-CM

## 2018-10-17 DIAGNOSIS — Z88.9 H/O MULTIPLE ALLERGIES: ICD-10-CM

## 2018-10-17 DIAGNOSIS — R42 INTERMITTENT LIGHTHEADEDNESS: ICD-10-CM

## 2018-10-17 DIAGNOSIS — F41.9 ANXIETY: Primary | ICD-10-CM

## 2018-10-17 DIAGNOSIS — R53.83 FATIGUE, UNSPECIFIED TYPE: ICD-10-CM

## 2018-10-17 PROCEDURE — 99214 OFFICE O/P EST MOD 30 MIN: CPT | Performed by: FAMILY MEDICINE

## 2018-10-17 PROCEDURE — 3008F BODY MASS INDEX DOCD: CPT | Performed by: FAMILY MEDICINE

## 2018-10-17 NOTE — PROGRESS NOTES
Assessment/Plan:  No problem-specific Assessment & Plan notes found for this encounter  Diagnoses and all orders for this visit:  Anxiety  -     Vitamin D 25 hydroxy; Future  -     TSH, 3rd generation with Free T4 reflex; Future  - well controlled on Lexapro 10mg QD (just refilled [id number] on 9/24/18)     Intermittent lightheadedness  Weakness generalized  Fatigue, unspecified type  H/O multiple allergies  - unclear etiology  - has been been evaluated by 2 separate cardiologists who do not think this is cardiogenic in nature  - had a nml tilt table test, Neuro c/s pending   - had nml Rheum labs  - has an Endo w/u pending   - Ambulatory referral to Allergy; Future  - RTO in 2/2019 for an annual exam - pt aware     Screening cholesterol level  -     Lipid panel; Future    Screening for diabetes mellitus  -     Comprehensive metabolic panel; Future    Screening, anemia, deficiency, iron  -     CBC and differential; Future  -     Iron, TIBC and Ferritin Panel; Future  -     Iron Saturation %; Future    Other orders  -     Cancel: SYRINGE/SINGLE-DOSE VIAL: influenza vaccine, [id number], quadrivalent, 0 5 mL, preservative-free, for patients 3+ yr (FLUZONE)        Subjective:    Patient ID: Angel Carnes is a 51 y o  female  49yo F presents to the office for f/u of weakness/intermittent palpitations and dizzness  - concerns for POTS but pt's tilt table test was nml   - did see Endo yesterday -- checking cortisol (?adrenal insufficiency), check hormone levels - ?related to approaching menopause, ? Cushings, cont Lexapro  - had the GI bug a few days ago and it knocked her for a loop, making a concerted effort to avoid sick contacts   - still with (+) intermittent palpitations, head rush, dizziness, fatigue, weakness, sweating, tachycardia, anxiety  - has been staying hydrated  - weight stable  - off OCPs till has Endocrine testing done   - would like a referral for Allergist - ?allergy mediated  - tolerating Lexapro 10mg QD well and feels like its helping relieve her palpitations/anxiety  - today in the office pt denies F/C/N/V/CP/SOB/wheezing/abd pain/LE edema   - interested in the Flu vaccine but would like to hold off till next OV   - trying not to get discouraged re: vague symptomology and no clear diagnosis       The following portions of the patient's history were reviewed and updated as appropriate: allergies, current medications, past family history, past medical history, past social history, past surgical history and problem list     Review of Systems  as per HPI     Objective:  /80 (BP Location: Right arm, Patient Position: Sitting, Cuff Size: Standard)   Pulse 64   Ht 5' 7" (1 702 m)   Wt 78 5 kg (173 lb)   SpO2 99%   BMI 27 10 kg/m²    Physical Exam   Constitutional: She is oriented to person, place, and time  She appears well-developed and well-nourished  No distress  HENT:   Head: Normocephalic and atraumatic  Nose: Nose normal    Eyes: Conjunctivae and EOM are normal  Right eye exhibits no discharge  Left eye exhibits no discharge  No scleral icterus  Neck: Normal range of motion  Cardiovascular: Normal rate, regular rhythm and normal heart sounds  Exam reveals no gallop and no friction rub  No murmur heard  Pulmonary/Chest: Effort normal and breath sounds normal  No respiratory distress  She has no wheezes  She has no rales  Abdominal: Soft  Bowel sounds are normal    Musculoskeletal: Normal range of motion  She exhibits no edema, tenderness or deformity  Neurological: She is alert and oriented to person, place, and time  Skin: Skin is warm  She is not diaphoretic  Psychiatric: She has a normal mood and affect  Her behavior is normal  Judgment and thought content normal    Vitals reviewed

## 2018-10-18 VITALS
DIASTOLIC BLOOD PRESSURE: 80 MMHG | SYSTOLIC BLOOD PRESSURE: 130 MMHG | HEART RATE: 64 BPM | WEIGHT: 173 LBS | OXYGEN SATURATION: 99 % | HEIGHT: 67 IN | BODY MASS INDEX: 27.15 KG/M2

## 2018-11-01 LAB
ESTRADIOL SERPL-MCNC: 89.9 PG/ML
FSH SERPL-ACNC: 5.6 MIU/ML
LH SERPL-ACNC: 7.3 MIU/ML
PROLACTIN SERPL-MCNC: 10.8 NG/ML (ref 4.8–23.3)
T4 FREE SERPL-MCNC: 1.01 NG/DL (ref 0.82–1.77)
TSH SERPL DL<=0.005 MIU/L-ACNC: 3.06 UIU/ML (ref 0.45–4.5)

## 2018-11-02 LAB
25(OH)D3+25(OH)D2 SERPL-MCNC: 54.4 NG/ML (ref 30–100)
ALBUMIN SERPL-MCNC: 4.5 G/DL (ref 3.5–5.5)
ALBUMIN/GLOB SERPL: 1.8 {RATIO} (ref 1.2–2.2)
ALP SERPL-CCNC: 60 IU/L (ref 39–117)
ALT SERPL-CCNC: 26 IU/L (ref 0–32)
AST SERPL-CCNC: 25 IU/L (ref 0–40)
BASOPHILS # BLD AUTO: 0 X10E3/UL (ref 0–0.2)
BASOPHILS NFR BLD AUTO: 0 %
BILIRUB SERPL-MCNC: 0.4 MG/DL (ref 0–1.2)
BUN SERPL-MCNC: 13 MG/DL (ref 6–24)
BUN/CREAT SERPL: 13 (ref 9–23)
CALCIUM SERPL-MCNC: 9.2 MG/DL (ref 8.7–10.2)
CHLORIDE SERPL-SCNC: 100 MMOL/L (ref 96–106)
CHOLEST SERPL-MCNC: 217 MG/DL (ref 100–199)
CO2 SERPL-SCNC: 26 MMOL/L (ref 20–29)
CREAT SERPL-MCNC: 1.01 MG/DL (ref 0.57–1)
EOSINOPHIL # BLD AUTO: 0.1 X10E3/UL (ref 0–0.4)
EOSINOPHIL NFR BLD AUTO: 2 %
ERYTHROCYTE [DISTWIDTH] IN BLOOD BY AUTOMATED COUNT: 13.4 % (ref 12.3–15.4)
FERRITIN SERPL-MCNC: 21 NG/ML (ref 15–150)
GLOBULIN SER-MCNC: 2.5 G/DL (ref 1.5–4.5)
GLUCOSE SERPL-MCNC: 81 MG/DL (ref 65–99)
HCT VFR BLD AUTO: 43.4 % (ref 34–46.6)
HDLC SERPL-MCNC: 82 MG/DL
HGB BLD-MCNC: 14.6 G/DL (ref 11.1–15.9)
IMM GRANULOCYTES # BLD: 0 X10E3/UL (ref 0–0.1)
IMM GRANULOCYTES NFR BLD: 0 %
IRON SATN MFR SERPL: 31 % (ref 15–55)
IRON SERPL-MCNC: 104 UG/DL (ref 27–159)
LABCORP COMMENT: NORMAL
LDLC SERPL CALC-MCNC: 120 MG/DL (ref 0–99)
LYMPHOCYTES # BLD AUTO: 1.2 X10E3/UL (ref 0.7–3.1)
LYMPHOCYTES NFR BLD AUTO: 37 %
MCH RBC QN AUTO: 31.5 PG (ref 26.6–33)
MCHC RBC AUTO-ENTMCNC: 33.6 G/DL (ref 31.5–35.7)
MCV RBC AUTO: 94 FL (ref 79–97)
MONOCYTES # BLD AUTO: 0.4 X10E3/UL (ref 0.1–0.9)
MONOCYTES NFR BLD AUTO: 12 %
NEUTROPHILS # BLD AUTO: 1.6 X10E3/UL (ref 1.4–7)
NEUTROPHILS NFR BLD AUTO: 49 %
PLATELET # BLD AUTO: 190 X10E3/UL (ref 150–379)
POTASSIUM SERPL-SCNC: 4.8 MMOL/L (ref 3.5–5.2)
PROT SERPL-MCNC: 7 G/DL (ref 6–8.5)
RBC # BLD AUTO: 4.64 X10E6/UL (ref 3.77–5.28)
SL AMB EGFR AFRICAN AMERICAN: 76 ML/MIN/1.73
SL AMB EGFR NON AFRICAN AMERICAN: 66 ML/MIN/1.73
SL AMB VLDL CHOLESTEROL CALC: 15 MG/DL (ref 5–40)
SODIUM SERPL-SCNC: 141 MMOL/L (ref 134–144)
TIBC SERPL-MCNC: 334 UG/DL (ref 250–450)
TRIGL SERPL-MCNC: 77 MG/DL (ref 0–149)
TSH SERPL DL<=0.005 MIU/L-ACNC: 3.01 UIU/ML (ref 0.45–4.5)
UIBC SERPL-MCNC: 230 UG/DL (ref 131–425)
WBC # BLD AUTO: 3.3 X10E3/UL (ref 3.4–10.8)

## 2018-11-07 ENCOUNTER — TELEPHONE (OUTPATIENT)
Dept: ENDOCRINOLOGY | Facility: CLINIC | Age: 48
End: 2018-11-07

## 2018-11-07 DIAGNOSIS — R53.82 CHRONIC FATIGUE: Primary | ICD-10-CM

## 2018-11-07 RX ORDER — DEXAMETHASONE 1 MG
TABLET ORAL
Qty: 1 TABLET | Refills: 0 | Status: SHIPPED | OUTPATIENT
Start: 2018-11-07 | End: 2018-12-12

## 2018-11-07 NOTE — TELEPHONE ENCOUNTER
Patient called and wanted to know if she can get another dexamethasone pill since last pill was taken but was late for her blood work

## 2018-11-15 LAB — CORTIS AM PEAK SERPL-MCNC: 0.6 UG/DL (ref 6.2–19.4)

## 2018-11-20 ENCOUNTER — TELEPHONE (OUTPATIENT)
Dept: ENDOCRINOLOGY | Facility: CLINIC | Age: 48
End: 2018-11-20

## 2018-11-20 NOTE — TELEPHONE ENCOUNTER
Yes- let her know she should get AM cortisol level done to rule out adrenal insufficiency as a cause of syncope  The other test was to rule out cushings disease which is excess steroid production

## 2018-11-20 NOTE — TELEPHONE ENCOUNTER
----- Message from Myra Vargas PA-C sent at 11/20/2018 10:27 AM EST -----  Please check if Patient took dexamethasone tablet night before lab test I see it was prescribed  If she did-- this is a good result though It looks like Dr Pradeep Marin was ordering AM cortisol to be done to rule out adrenal insufficiency as cause of syncope so would repeat AM cortisol level with no dexamethasone

## 2018-11-20 NOTE — TELEPHONE ENCOUNTER
----- Message from Angie Glaser PA-C sent at 11/20/2018 10:29 AM EST -----  Labs normal except see separate task for cortisol result  Looks like dr Halle Salmon wanted testing for AM cortisol with and without dexamethasone tablet  Need to find out if dexamethasone was taken night before lab

## 2018-11-20 NOTE — TELEPHONE ENCOUNTER
Spoke with patient and states that she did take medication night before test  Would you still like her to take am test without pill? ?

## 2018-11-21 DIAGNOSIS — R53.82 CHRONIC FATIGUE: Primary | ICD-10-CM

## 2018-12-07 LAB — CORTIS AM PEAK SERPL-MCNC: 12.1 UG/DL (ref 6.2–19.4)

## 2018-12-12 ENCOUNTER — APPOINTMENT (EMERGENCY)
Dept: CT IMAGING | Facility: HOSPITAL | Age: 48
End: 2018-12-12
Payer: COMMERCIAL

## 2018-12-12 ENCOUNTER — TELEPHONE (OUTPATIENT)
Dept: ENDOCRINOLOGY | Facility: CLINIC | Age: 48
End: 2018-12-12

## 2018-12-12 ENCOUNTER — HOSPITAL ENCOUNTER (EMERGENCY)
Facility: HOSPITAL | Age: 48
Discharge: HOME | End: 2018-12-12
Attending: EMERGENCY MEDICINE | Admitting: EMERGENCY MEDICINE
Payer: COMMERCIAL

## 2018-12-12 VITALS
TEMPERATURE: 97.5 F | HEART RATE: 74 BPM | SYSTOLIC BLOOD PRESSURE: 126 MMHG | BODY MASS INDEX: 27.35 KG/M2 | OXYGEN SATURATION: 98 % | WEIGHT: 174.6 LBS | DIASTOLIC BLOOD PRESSURE: 86 MMHG | RESPIRATION RATE: 18 BRPM

## 2018-12-12 DIAGNOSIS — R42 DIZZINESS: ICD-10-CM

## 2018-12-12 DIAGNOSIS — R53.83 FATIGUE: Primary | ICD-10-CM

## 2018-12-12 LAB
ALBUMIN SERPL BCP-MCNC: 3.9 G/DL (ref 3.5–5)
ALP SERPL-CCNC: 59 U/L (ref 46–116)
ALT SERPL W P-5'-P-CCNC: 24 U/L (ref 12–78)
ANION GAP SERPL CALCULATED.3IONS-SCNC: 7 MMOL/L (ref 4–13)
AST SERPL W P-5'-P-CCNC: 11 U/L (ref 5–45)
ATRIAL RATE: 66 BPM
BASOPHILS # BLD AUTO: 0.02 THOUSANDS/ΜL (ref 0–0.1)
BASOPHILS NFR BLD AUTO: 0 % (ref 0–1)
BILIRUB SERPL-MCNC: 0.4 MG/DL (ref 0.2–1)
BILIRUB UR QL STRIP: NEGATIVE
BUN SERPL-MCNC: 14 MG/DL (ref 5–25)
CALCIUM SERPL-MCNC: 9.3 MG/DL (ref 8.3–10.1)
CHLORIDE SERPL-SCNC: 104 MMOL/L (ref 100–108)
CLARITY UR: CLEAR
CO2 SERPL-SCNC: 30 MMOL/L (ref 21–32)
COLOR UR: YELLOW
CREAT SERPL-MCNC: 1.02 MG/DL (ref 0.6–1.3)
EOSINOPHIL # BLD AUTO: 0.05 THOUSAND/ΜL (ref 0–0.61)
EOSINOPHIL NFR BLD AUTO: 1 % (ref 0–6)
ERYTHROCYTE [DISTWIDTH] IN BLOOD BY AUTOMATED COUNT: 11.4 % (ref 11.6–15.1)
GFR SERPL CREATININE-BSD FRML MDRD: 65 ML/MIN/1.73SQ M
GLUCOSE SERPL-MCNC: 87 MG/DL (ref 65–140)
GLUCOSE SERPL-MCNC: 95 MG/DL (ref 65–140)
GLUCOSE UR STRIP-MCNC: NEGATIVE MG/DL
HCT VFR BLD AUTO: 41.4 % (ref 34.8–46.1)
HGB BLD-MCNC: 14.4 G/DL (ref 11.5–15.4)
HGB UR QL STRIP.AUTO: NEGATIVE
IMM GRANULOCYTES # BLD AUTO: 0.01 THOUSAND/UL (ref 0–0.2)
IMM GRANULOCYTES NFR BLD AUTO: 0 % (ref 0–2)
KETONES UR STRIP-MCNC: NEGATIVE MG/DL
LEUKOCYTE ESTERASE UR QL STRIP: NEGATIVE
LYMPHOCYTES # BLD AUTO: 1.05 THOUSANDS/ΜL (ref 0.6–4.47)
LYMPHOCYTES NFR BLD AUTO: 21 % (ref 14–44)
MCH RBC QN AUTO: 31.7 PG (ref 26.8–34.3)
MCHC RBC AUTO-ENTMCNC: 34.8 G/DL (ref 31.4–37.4)
MCV RBC AUTO: 91 FL (ref 82–98)
MONOCYTES # BLD AUTO: 0.53 THOUSAND/ΜL (ref 0.17–1.22)
MONOCYTES NFR BLD AUTO: 11 % (ref 4–12)
NEUTROPHILS # BLD AUTO: 3.29 THOUSANDS/ΜL (ref 1.85–7.62)
NEUTS SEG NFR BLD AUTO: 67 % (ref 43–75)
NITRITE UR QL STRIP: NEGATIVE
NRBC BLD AUTO-RTO: 0 /100 WBCS
P AXIS: 52 DEGREES
PH UR STRIP.AUTO: 5.5 [PH] (ref 4.5–8)
PLATELET # BLD AUTO: 180 THOUSANDS/UL (ref 149–390)
PMV BLD AUTO: 9.6 FL (ref 8.9–12.7)
POTASSIUM SERPL-SCNC: 4.7 MMOL/L (ref 3.5–5.3)
PR INTERVAL: 138 MS
PROT SERPL-MCNC: 7.2 G/DL (ref 6.4–8.2)
PROT UR STRIP-MCNC: NEGATIVE MG/DL
QRS AXIS: 59 DEGREES
QRSD INTERVAL: 70 MS
QT INTERVAL: 398 MS
QTC INTERVAL: 408 MS
RBC # BLD AUTO: 4.54 MILLION/UL (ref 3.81–5.12)
SODIUM SERPL-SCNC: 141 MMOL/L (ref 136–145)
SP GR UR STRIP.AUTO: 1.01 (ref 1–1.03)
T WAVE AXIS: 15 DEGREES
UROBILINOGEN UR QL STRIP.AUTO: 0.2 E.U./DL
VENTRICULAR RATE: 63 BPM
WBC # BLD AUTO: 4.95 THOUSAND/UL (ref 4.31–10.16)

## 2018-12-12 PROCEDURE — 80053 COMPREHEN METABOLIC PANEL: CPT | Performed by: EMERGENCY MEDICINE

## 2018-12-12 PROCEDURE — 96361 HYDRATE IV INFUSION ADD-ON: CPT

## 2018-12-12 PROCEDURE — 85025 COMPLETE CBC W/AUTO DIFF WBC: CPT | Performed by: EMERGENCY MEDICINE

## 2018-12-12 PROCEDURE — 81003 URINALYSIS AUTO W/O SCOPE: CPT

## 2018-12-12 PROCEDURE — 70450 CT HEAD/BRAIN W/O DYE: CPT

## 2018-12-12 PROCEDURE — 82948 REAGENT STRIP/BLOOD GLUCOSE: CPT

## 2018-12-12 PROCEDURE — 36415 COLL VENOUS BLD VENIPUNCTURE: CPT | Performed by: EMERGENCY MEDICINE

## 2018-12-12 PROCEDURE — 96374 THER/PROPH/DIAG INJ IV PUSH: CPT

## 2018-12-12 PROCEDURE — 93010 ELECTROCARDIOGRAM REPORT: CPT | Performed by: INTERNAL MEDICINE

## 2018-12-12 PROCEDURE — 93005 ELECTROCARDIOGRAM TRACING: CPT

## 2018-12-12 PROCEDURE — 99285 EMERGENCY DEPT VISIT HI MDM: CPT

## 2018-12-12 RX ORDER — KETOROLAC TROMETHAMINE 30 MG/ML
30 INJECTION, SOLUTION INTRAMUSCULAR; INTRAVENOUS ONCE
Status: COMPLETED | OUTPATIENT
Start: 2018-12-12 | End: 2018-12-12

## 2018-12-12 RX ADMIN — KETOROLAC TROMETHAMINE 30 MG: 30 INJECTION, SOLUTION INTRAMUSCULAR at 10:50

## 2018-12-12 RX ADMIN — SODIUM CHLORIDE 1000 ML: 0.9 INJECTION, SOLUTION INTRAVENOUS at 10:50

## 2018-12-12 NOTE — TELEPHONE ENCOUNTER
Pt's mother Annalise Unger called and stated that pt is not being admitted and the hospital did not do the [address] stimulating testing that you suggested     Wanted to know if you can order the test?

## 2018-12-12 NOTE — ED PROVIDER NOTES
History  Chief Complaint   Patient presents with    Weakness - Generalized     Pt states for the last couple months she been having extreme, fatigue, dizziness  Pt states she has been seeing her family doc and going to see a endocrinologist  Pt states for the last 2 weeks has been the worse  She states she cant get out of bed  44-year-old female presents today complaining of fatigue, myalgias, and dizziness  Symptoms have been ongoing for months  She has been seen by her PCP as well as Endocrinology  Most recently had cortisol stimulation test done as an outpatient but did not have the results yet  Called her endocrinologist Monday, and yesterday without a return phone call  States she tried to go to work today just could not  History provided by:  Patient  Dizziness   Quality:  Lightheadedness  Severity:  Moderate  Timing:  Constant  Progression:  Waxing and waning  Chronicity:  Recurrent  Context comment:  See HPI  Relieved by:  None tried  Worsened by:  Nothing  Ineffective treatments:  None tried  Associated symptoms: headaches    Associated symptoms: no blood in stool, no chest pain, no diarrhea, no hearing loss, no nausea, no palpitations, no shortness of breath, no syncope, no tinnitus, no vision changes, no vomiting and no weakness    Risk factors: no heart disease, no hx of vertigo and no new medications        Prior to Admission Medications   Prescriptions Last Dose Informant Patient Reported? Taking?    Ascorbic Acid (VITAMIN C) 1000 MG tablet  Self Yes Yes   Sig: Take 1,000 mg by mouth daily   Calcium Carbonate-Vitamin D (CALCIUM 500 + D PO)  Self Yes Yes   Sig: Take by mouth   Cholecalciferol (VITAMIN D3) 5000 units TABS  Self Yes Yes   Sig: Take 5,000 Units by mouth daily   Cyanocobalamin (VITAMIN B-12) 3000 MCG SUBL   Yes Yes   Sig: Place under the tongue daily   Specialty Vitamins Products (MAGNESIUM, AMINO ACID CHELATE,) 133 MG tablet  Self Yes Yes   Sig: Take 1 tablet by mouth 2 (two) times a day   butalbital-acetaminophen-caffeine (FIORICET,ESGIC) -40 mg per tablet  Self Yes Yes   Sig: TAKE 1 TABLET BY MOUTH EVERY 6 HOURS AS NEEDED FOR HEADACHE   escitalopram (LEXAPRO) 10 mg tablet   No Yes   Sig: TAKE 1 TABLET BY MOUTH DAILY   folic acid (FOLVITE) 199 MCG tablet  Self Yes Yes   Sig: Take 800 mcg by mouth daily      Facility-Administered Medications: None       Past Medical History:   Diagnosis Date    Anxiety     Dermatitis 02/15/2007    LAST ASSESSED: 2/15/07    Low blood pressure     Migraines     Palpitations        Past Surgical History:   Procedure Laterality Date    NO PAST SURGERIES         Family History   Problem Relation Age of Onset    Kidney cancer Mother 61        Ureter Ca    Hyperlipidemia Mother     Heart attack Maternal Grandmother [de-identified]    Transient ischemic attack Maternal Grandmother     Kidney cancer Maternal Grandfather     Colon cancer Maternal Grandfather [de-identified]    Leukemia Paternal Grandfather     Hypertension Father     Hyperlipidemia Father     Thyroid disease Father     Hypertension Brother     Stroke Brother 40    Transient ischemic attack Paternal Grandmother      I have reviewed and agree with the history as documented  Social History   Substance Use Topics    Smoking status: Never Smoker    Smokeless tobacco: Never Used    Alcohol use No        Review of Systems   Constitutional: Negative for chills, diaphoresis and fatigue  HENT: Negative for congestion, hearing loss and tinnitus  Eyes: Negative for visual disturbance  Respiratory: Negative for chest tightness and shortness of breath  Cardiovascular: Negative for chest pain, palpitations and syncope  Gastrointestinal: Negative for blood in stool, diarrhea, nausea and vomiting  Genitourinary: Negative for difficulty urinating  Musculoskeletal: Positive for myalgias  Skin: Negative for pallor, rash and wound     Neurological: Positive for dizziness, light-headedness and headaches  Negative for weakness  Psychiatric/Behavioral: Negative for confusion  Physical Exam  Physical Exam   Constitutional: She is oriented to person, place, and time  She appears well-developed and well-nourished  HENT:   Head: Normocephalic and atraumatic  Mouth/Throat: Uvula is midline, oropharynx is clear and moist and mucous membranes are normal  No tonsillar exudate  Eyes: Pupils are equal, round, and reactive to light  Neck: Normal range of motion  Neck supple  Cardiovascular: Normal rate and regular rhythm  Pulmonary/Chest: Effort normal and breath sounds normal    Abdominal: Soft  Bowel sounds are normal  There is no tenderness  There is no rebound and no guarding  Musculoskeletal: Normal range of motion  Neurological: She is alert and oriented to person, place, and time  No neurologic deficits  Alert and oriented to person, place, and time  GCS 15  CN II-XII intact  Speech is clear and not slurred  No word finding issues  Sensation grossly intact  Strength 5/5 in bilateral UE and LE  Finger to nose intact bilaterally  Strength equal upper extremities b/l  Strength equal in lower extremities b/l  Able to hold extremities against gravity x 10 seconds without drift x 4  No pronator drift  Skin: Skin is warm and dry  Capillary refill takes less than 2 seconds  Psychiatric: She has a normal mood and affect  Nursing note and vitals reviewed        Vital Signs  ED Triage Vitals   Temperature Pulse Respirations Blood Pressure SpO2   12/12/18 0945 12/12/18 0945 12/12/18 0945 12/12/18 0945 12/12/18 0945   97 5 °F (36 4 °C) 66 16 132/87 98 %      Temp Source Heart Rate Source Patient Position - Orthostatic VS BP Location FiO2 (%)   12/12/18 0945 12/12/18 0945 12/12/18 0945 12/12/18 0945 --   Oral Monitor Lying Right arm       Pain Score       12/12/18 1050       No Pain           Vitals:    12/12/18 1149 12/12/18 1150 12/12/18 1151 12/12/18 1203   BP: 136/84 132/90 126/86 126/86   Pulse: 72 70 70 74   Patient Position - Orthostatic VS: Sitting - Orthostatic VS Standing - Orthostatic VS Standing for 3 minutes - Orthostatic VS Lying       Visual Acuity      ED Medications  Medications   sodium chloride 0 9 % bolus 1,000 mL (0 mL Intravenous Stopped 12/12/18 1249)   ketorolac (TORADOL) injection 30 mg (30 mg Intravenous Given 12/12/18 1050)       Diagnostic Studies  Results Reviewed     Procedure Component Value Units Date/Time    ED Urine Macroscopic [[id number]] Collected:  12/12/18 1221    Lab Status:  Final result Specimen:  Urine Updated:  12/12/18 1224     Color, UA Yellow     Clarity, UA Clear     pH, UA 5 5     Leukocytes, UA Negative     Nitrite, UA Negative     Protein, UA Negative mg/dl      Glucose, UA Negative mg/dl      Ketones, UA Negative mg/dl      Urobilinogen, UA 0 2 E U /dl      Bilirubin, UA Negative     Blood, UA Negative     Specific Gravity, UA 1 010    Narrative:       CLINITEK RESULT    Comprehensive metabolic panel [[id number]] Collected:  12/12/18 1029    Lab Status:  Final result Specimen:  Blood from Arm, Right Updated:  12/12/18 1058     Sodium 141 mmol/L      Potassium 4 7 mmol/L      Chloride 104 mmol/L      CO2 30 mmol/L      ANION GAP 7 mmol/L      BUN 14 mg/dL      Creatinine 1 02 mg/dL      Glucose 95 mg/dL      Calcium 9 3 mg/dL      AST 11 U/L      ALT 24 U/L      Alkaline Phosphatase 59 U/L      Total Protein 7 2 g/dL      Albumin 3 9 g/dL      Total Bilirubin 0 40 mg/dL      eGFR 65 ml/min/1 73sq m     Narrative:         National Kidney Disease Education Program recommendations are as follows:  GFR calculation is accurate only with a steady state creatinine  Chronic Kidney disease less than 60 ml/min/1 73 sq  meters  Kidney failure less than 15 ml/min/1 73 sq  meters      CBC and differential [[id number]]  (Abnormal) Collected:  12/12/18 1030    Lab Status:  Final result Specimen:  Blood from Arm, Right Updated:  12/12/18 1037     WBC 4 95 Thousand/uL      RBC 4 54 Million/uL      Hemoglobin 14 4 g/dL      Hematocrit 41 4 %      MCV 91 fL      MCH 31 7 pg      MCHC 34 8 g/dL      RDW 11 4 (L) %      MPV 9 6 fL      Platelets 693 Thousands/uL      nRBC 0 /100 WBCs      Neutrophils Relative 67 %      Immat GRANS % 0 %      Lymphocytes Relative 21 %      Monocytes Relative 11 %      Eosinophils Relative 1 %      Basophils Relative 0 %      Neutrophils Absolute 3 29 Thousands/µL      Immature Grans Absolute 0 01 Thousand/uL      Lymphocytes Absolute 1 05 Thousands/µL      Monocytes Absolute 0 53 Thousand/µL      Eosinophils Absolute 0 05 Thousand/µL      Basophils Absolute 0 02 Thousands/µL     Fingerstick Glucose (POCT) [[id number]]  (Normal) Collected:  12/12/18 1035    Lab Status:  Final result Updated:  12/12/18 1036     POC Glucose 87 mg/dl                  CT head without contrast   Final Result by Mohini Bro MD (12/12 1123)      No acute intracranial abnormality  Workstation performed: [id number]                    Procedures  ECG 12 Lead Documentation  Date/Time: 12/12/2018 10:50 AM  Performed by: [employer]  Authorized by: [employer]     Indications / Diagnosis:  Dizziness  ECG reviewed by me, the ED Provider: yes    Patient location:  ED  Previous ECG:     Previous ECG:  Compared to current  Comments:      Normal sinus rhythm at 63 beats per minute  Normal axis, normal intervals, QTC is normal   No ST T wave abnormalities suggestive of ischemia  No significant change compared to prior from 01/31/2018  Phone Contacts  ED Phone Contact    ED Course                               MDM  Number of Diagnoses or Management Options  Dizziness: new and requires workup  Fatigue: new and requires workup  Diagnosis management comments: 12:56 PM  Labs, orthostatics, urinalysis, EKG and CT head are all negative for acute pathology  Patient and family are extremely frustrated with the lack of answers    I explained that our role in the emergency department is to look for acute problems and the likelihood of us solving a problem that has been present for over a year is unlikely  There is no indication for admission and I explained that even if we did admit her, there is no guarantee that all the testing we do would produce a diagnosis  I recommended f/u with endocrine as an outpatient  Patient and family were appreciative and grateful  RTED instructions reviewed  Patient is stable for discharge  Amount and/or Complexity of Data Reviewed  Clinical lab tests: ordered and reviewed  Tests in the radiology section of CPT®: ordered and reviewed  Tests in the medicine section of CPT®: reviewed and ordered  Review and summarize past medical records: yes  Independent visualization of images, tracings, or specimens: yes    Risk of Complications, Morbidity, and/or Mortality  Presenting problems: high  Diagnostic procedures: high  Management options: moderate    Patient Progress  Patient progress: stable    CritCare Time    Disposition  Final diagnoses:   Fatigue   Dizziness     Time reflects when diagnosis was documented in both MDM as applicable and the Disposition within this note     Time User Action Codes Description Comment    12/12/2018 12:22 PM Akil Co [R53 83] Fatigue     12/12/2018 12:22 PM Beryl Bashir [R42] Dizziness       ED Disposition     ED Disposition Condition Comment    Discharge  Jaz Jacinto discharge to home/self care      Condition at discharge: Stable        Follow-up Information     Follow up With Specialties Details Why [address], DO Family Medicine Schedule an appointment as soon as possible for a visit  [address] [facility] [place]  [phone]            Discharge Medication List as of 12/12/2018 12:26 PM      CONTINUE these medications which have NOT CHANGED    Details   Ascorbic Acid (VITAMIN C) 1000 MG tablet Take 1,000 mg by mouth daily, Historical Med butalbital-acetaminophen-caffeine (FIORICET,ESGIC) -40 mg per tablet TAKE 1 TABLET BY MOUTH EVERY 6 HOURS AS NEEDED FOR HEADACHE, Historical Med      Calcium Carbonate-Vitamin D (CALCIUM 500 + D PO) Take by mouth, Historical Med      Cholecalciferol (VITAMIN D3) 5000 units TABS Take 5,000 Units by mouth daily, Historical Med      Cyanocobalamin (VITAMIN B-12) 3000 MCG SUBL Place under the tongue daily, Historical Med      escitalopram (LEXAPRO) 10 mg tablet TAKE 1 TABLET BY MOUTH DAILY, Normal      folic acid (FOLVITE) 354 MCG tablet Take 800 mcg by mouth daily, Historical Med      Specialty Vitamins Products (MAGNESIUM, AMINO ACID CHELATE,) 133 MG tablet Take 1 tablet by mouth 2 (two) times a day, Historical Med           No discharge procedures on file      ED Provider  Electronically Signed by           Brayton Hodgkins, DO  12/12/18 6426

## 2018-12-12 NOTE — TELEPHONE ENCOUNTER
Called pt's mother Anita Villarreal and provided info from Lorraine Gandhi, advised if she is not admitted to call back and we can put order in for her to go to infusion center for [address] stimulating testing

## 2018-12-12 NOTE — TELEPHONE ENCOUNTER
Would suggest ACTH Stimulation testing to definitively rule out adrenal insufficiency-- can be set up through infusion center  If she is admitted, could be done in hospital if inpatient providers feel that it is indicated based on her symptoms

## 2018-12-12 NOTE — TELEPHONE ENCOUNTER
Called the infusion center and got her scheduled 12/19 at 8am  Infusion center is sending over order form to be completed by you and they did say that a physician has to sign it  What DX code do you want on the order    Since I didn't have one at the time of the call I had her put the codes for Fatigued, anxiety and palpitation, if you want me to change it let me know and I call them back   Scheduled her  fu with Tianna Swan on 12/28

## 2018-12-12 NOTE — TELEPHONE ENCOUNTER
Pt's mother Jaren Montero called and stated that daughter is in the ED, she is light headed, fatigued, very weak, dizzy and bad headaches  Mother stated that pt saw Dr Lucas Owusu in Oct and she wanted to know if she wanted to order any specific test   advised that Dr Lucas Owusu was no longer in our office the and I will let Annel Luna know that pt is in ED and advised that the ED doctor will order any tests and we will get the results     And once she is out of the hospital and wants to schedule appt to give us a call back because she does not have a f/u appt schedule

## 2018-12-12 NOTE — DISCHARGE INSTRUCTIONS
Dizziness   WHAT YOU NEED TO KNOW:   Dizziness is a feeling of being off balance or unsteady  Common causes of dizziness are an inner ear fluid imbalance or a lack of oxygen in your blood  Dizziness may be acute (lasts 3 days or less) or chronic (lasts longer than 3 days)  You may have dizzy spells that last from seconds to a few hours  DISCHARGE INSTRUCTIONS:   Return to the emergency department if:   · You have a headache and a stiff neck  · You have shaking chills and a fever  · You vomit over and over with no relief  · Your vomit or bowel movements are red or black  · You have pain in your chest, back, or abdomen  · You have numbness, especially in your face, arms, or legs  · You have trouble moving your arms or legs  · You are confused  Contact your healthcare provider if:   · You have a fever  · Your symptoms do not get better with treatment  · You have questions or concerns about your condition or care  Manage your symptoms:   · Do not drive  or operate heavy machinery when you are dizzy  · Get up slowly  from sitting or lying down  · Drink plenty of liquids  Liquids help prevent dehydration  Ask how much liquid to drink each day and which liquids are best for you  Follow up with your healthcare provider as directed:  Write down your questions so you remember to ask them during your visits  © 2017 [address]  Information is for End User's use only and may not be sold, redistributed or otherwise used for commercial purposes  All illustrations and images included in CareNotes® are the copyrighted property of [employer]  or [employer]  The above information is an  only  It is not intended as medical advice for individual conditions or treatments  Talk to your doctor, nurse or pharmacist before following any medical regimen to see if it is safe and effective for you      Fatigue   WHAT YOU NEED TO KNOW:   Fatigue is mental and physical exhaustion that does not get better with rest  Fatigue may make daily activities difficult or cause extreme sleepiness  It is normal to feel tired sometimes, but long-term fatigue may be a sign of serious illness  DISCHARGE INSTRUCTIONS:   Return to the emergency department if:   · You have chest pain  · You have difficulty breathing  Contact your healthcare provider if:   · You have a cough that gets worse, or does not go away  · You see blood in your urine or bowel movement  · You have numbness or tingling around your mouth or in an arm or leg  · You faint, feel dizzy, or have vision changes  · You have swelling in your lymph nodes  · You are a woman and have vaginal bleeding that is not normal for you, or is not expected  · You lose weight without trying, or you have trouble eating  · You feel weak or have muscle pain  · You have pain or swelling in your joints  · You have questions or concerns about your condition or care  Follow up with your healthcare provider as directed: You may need more tests  Your healthcare provider may also refer you to a specialist  Write down your questions so you remember to ask them during your visits  Manage fatigue:   · Keep a fatigue diary  Include anything that makes you feel more tired or less tired  Bring the diary with you to follow-up visits with your provider  · Exercise as directed  Exercise can help you feel more alert  Exercise can also help you manage stress or relieve depression  Try to get at least 30 minutes of exercise most days of the week  · Keep a regular sleep schedule  Go to bed and wake up at the same times every day  Limit naps to 1 hour each day  A nap can improve fatigue, but a long nap may make it harder to go to sleep at night  · Plan and limit your activities  Limit the number of activities such as shopping and cleaning you do each day   If possible, try to spread out your trips throughout the week  Plan ahead so you are not rushing to get something done  Only do activities that you have the energy to complete  Take breaks between activities  Ask for help if you need it  Another person may be able to drive you or help with daily activities  · Eat a variety of healthy foods  Healthy foods include fruits, vegetables, whole-grain breads, low-fat dairy products, beans, lean meats, and fish  Good nutrition can help manage fatigue  · Limit caffeine and alcohol  These can make it difficult to fall or stay asleep  Women should limit alcohol to 1 drink a day  Men should limit alcohol to 2 drinks a day  A drink of alcohol is 12 ounces of beer, 5 ounces of wine, or 1½ ounces of liquor  Ask our healthcare provider how much caffeine is safe for you  · Do not smoke  Nicotine and other chemicals in cigarettes and cigars can cause lung damage and increase fatigue  Ask your healthcare provider for information if you currently smoke and need help to quit  E-cigarettes or smokeless tobacco still contain nicotine  Talk to your healthcare provider before you use these products  © 2017 [address] [facility] Information is for End User's use only and may not be sold, redistributed or otherwise used for commercial purposes  All illustrations and images included in CareNotes® are the copyrighted property of [employer]  or [employer]  The above information is an  only  It is not intended as medical advice for individual conditions or treatments  Talk to your doctor, nurse or pharmacist before following any medical regimen to see if it is safe and effective for you

## 2018-12-12 NOTE — TELEPHONE ENCOUNTER
Can you set up through infusion center? I think miko should have the orders in her folder  1 hour ACTH stim test to rule out infusion center  Let me know if I need to sign anything  Thanks!   Also since dr Bia Castellon no longer with practice should set up follow up with someone else in the office thanks

## 2018-12-14 ENCOUNTER — TELEPHONE (OUTPATIENT)
Dept: ENDOCRINOLOGY | Facility: CLINIC | Age: 48
End: 2018-12-14

## 2018-12-14 NOTE — TELEPHONE ENCOUNTER
----- Message from TJ Matamoros sent at 12/11/2018  4:38 PM EST -----  Cortisol level in middle/normal range but if has symptoms suggestive of adrenal insufficiency (such as dizziness, fainting, vomiting, weight loss, excessive fatigue, skin discoloration) ACTH Stimulation testing could be performed if needed to definitively rule out   Discuss at next visit

## 2018-12-14 NOTE — TELEPHONE ENCOUNTER
Pt aware and ACTH stim test has been ordered and she has appt this month at infusion center at [facility]

## 2018-12-18 RX ORDER — SODIUM CHLORIDE 9 MG/ML
20 INJECTION, SOLUTION INTRAVENOUS CONTINUOUS
Status: DISCONTINUED | OUTPATIENT
Start: 2018-12-19 | End: 2018-12-22 | Stop reason: HOSPADM

## 2018-12-18 RX ORDER — COSYNTROPIN 0.25 MG/ML
0.25 INJECTION, POWDER, FOR SOLUTION INTRAMUSCULAR; INTRAVENOUS ONCE
Status: COMPLETED | OUTPATIENT
Start: 2018-12-19 | End: 2018-12-19

## 2018-12-19 ENCOUNTER — HOSPITAL ENCOUNTER (OUTPATIENT)
Dept: INFUSION CENTER | Facility: CLINIC | Age: 48
Discharge: HOME | End: 2018-12-19
Payer: COMMERCIAL

## 2018-12-19 VITALS
RESPIRATION RATE: 18 BRPM | HEART RATE: 75 BPM | SYSTOLIC BLOOD PRESSURE: 118 MMHG | DIASTOLIC BLOOD PRESSURE: 78 MMHG | TEMPERATURE: 98.9 F | WEIGHT: 171 LBS | HEIGHT: 67 IN | BODY MASS INDEX: 26.84 KG/M2 | OXYGEN SATURATION: 98 %

## 2018-12-19 LAB
CORTIS SERPL-MCNC: 13 UG/DL
CORTIS SERPL-MCNC: 22 UG/DL
CORTIS SERPL-MCNC: 25.2 UG/DL

## 2018-12-19 PROCEDURE — 82533 TOTAL CORTISOL: CPT | Performed by: INTERNAL MEDICINE

## 2018-12-19 PROCEDURE — 82024 ASSAY OF ACTH: CPT | Performed by: INTERNAL MEDICINE

## 2018-12-19 RX ADMIN — COSYNTROPIN 0.25 MG: 0.25 INJECTION, POWDER, LYOPHILIZED, FOR SOLUTION INTRAMUSCULAR; INTRAVENOUS at 08:38

## 2018-12-19 NOTE — PROGRESS NOTES
To clinic for ACTH stim test   Pt reports symptoms of significant fatigue, dizziness, heart palpitations and general feeling of being unwell  These symptoms are at her baseline today  Test/blood draws performed as per orders    Pt discharged to home post test

## 2018-12-20 ENCOUNTER — TELEPHONE (OUTPATIENT)
Dept: ENDOCRINOLOGY | Facility: CLINIC | Age: 48
End: 2018-12-20

## 2018-12-20 LAB — ACTH PLAS-MCNC: 45.3 PG/ML (ref 7.2–63.3)

## 2018-12-20 NOTE — TELEPHONE ENCOUNTER
----- Message from Hdey Gandhi MD sent at 12/20/2018  2:52 PM EST -----  The laboratory testing results are normal

## 2018-12-22 DIAGNOSIS — F41.1 GAD (GENERALIZED ANXIETY DISORDER): ICD-10-CM

## 2018-12-24 ENCOUNTER — TELEPHONE (OUTPATIENT)
Dept: FAMILY MEDICINE CLINIC | Facility: CLINIC | Age: 48
End: 2018-12-24

## 2018-12-24 NOTE — TELEPHONE ENCOUNTER
Patient called regarding changing her dosage of lexapro form 5 mg to 10 mg    I spoke with her about scheduling an appointment for a med change she advised me you agreed to do it over the phone if needed for th holidays  There is 10 mg in her cart so I guess it just needs to be refilled

## 2018-12-25 RX ORDER — ESCITALOPRAM OXALATE 10 MG/1
TABLET ORAL
Qty: 90 TABLET | Refills: 0 | Status: SHIPPED | OUTPATIENT
Start: 2018-12-25 | End: 2019-03-20 | Stop reason: SDUPTHER

## 2019-03-20 ENCOUNTER — OFFICE VISIT (OUTPATIENT)
Dept: FAMILY MEDICINE CLINIC | Facility: CLINIC | Age: 49
End: 2019-03-20
Payer: COMMERCIAL

## 2019-03-20 VITALS
RESPIRATION RATE: 18 BRPM | WEIGHT: 169.4 LBS | BODY MASS INDEX: 26.59 KG/M2 | SYSTOLIC BLOOD PRESSURE: 130 MMHG | DIASTOLIC BLOOD PRESSURE: 80 MMHG | HEIGHT: 67 IN | OXYGEN SATURATION: 98 % | HEART RATE: 81 BPM

## 2019-03-20 DIAGNOSIS — R53.83 FATIGUE, UNSPECIFIED TYPE: ICD-10-CM

## 2019-03-20 DIAGNOSIS — Z00.00 ANNUAL PHYSICAL EXAM: Primary | ICD-10-CM

## 2019-03-20 DIAGNOSIS — R53.1 WEAKNESS GENERALIZED: ICD-10-CM

## 2019-03-20 DIAGNOSIS — R42 INTERMITTENT LIGHTHEADEDNESS: ICD-10-CM

## 2019-03-20 DIAGNOSIS — E78.5 BORDERLINE HYPERLIPIDEMIA: ICD-10-CM

## 2019-03-20 DIAGNOSIS — F41.1 GAD (GENERALIZED ANXIETY DISORDER): ICD-10-CM

## 2019-03-20 DIAGNOSIS — Z12.31 SCREENING MAMMOGRAM, ENCOUNTER FOR: ICD-10-CM

## 2019-03-20 PROCEDURE — 99396 PREV VISIT EST AGE 40-64: CPT | Performed by: FAMILY MEDICINE

## 2019-03-20 PROCEDURE — 99214 OFFICE O/P EST MOD 30 MIN: CPT | Performed by: FAMILY MEDICINE

## 2019-03-20 PROCEDURE — 1036F TOBACCO NON-USER: CPT | Performed by: FAMILY MEDICINE

## 2019-03-20 PROCEDURE — 3008F BODY MASS INDEX DOCD: CPT | Performed by: FAMILY MEDICINE

## 2019-03-20 RX ORDER — ESCITALOPRAM OXALATE 10 MG/1
10 TABLET ORAL DAILY
Qty: 90 TABLET | Refills: 0 | Status: SHIPPED | OUTPATIENT
Start: 2019-03-20 | End: 2019-06-20 | Stop reason: SDUPTHER

## 2019-03-20 NOTE — PROGRESS NOTES
Assessment/Plan:   Diagnoses and all orders for this visit:  Annual physical exam  - reviewed PMHx, PSHx, meds, allergies, FHx and Soc Hx  - UTD with Tdap (2016)   - no Flu vaccine this season   - nml CBC/CMP/EKG and CT head in the [facility] on 12/2018  - nml Vit D and TSH in 10/2018   - overdue for an annual GYN exam and Mammo (referral and script given)     Intermittent lightheadedness  Weakness generalized  Fatigue, unspecified type  - unclear etiology  - has been been evaluated by 2 separate cardiologists who do not think this is cardiogenic in nature  - nml Tilts   - did not f/u with Neuro re: ? 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   - nml orthostatics, EKG, CBC, CMP, UA, CT head in the [facility] on 12/2018  - has been cleared by Endo but has an appt on 4/8 for a 2nd opinion  - went to see a functional medicine doctor and got tested for EBV/CMV and extensive lyme panel (results pending)     DODIE (generalized anxiety disorder)  -     escitalopram (LEXAPRO) 10 mg tablet; Take 1 tablet (10 mg total) by mouth daily  - well controlled - cont current regimen   - RTO in 3months for f/u - pt aware and agreeable     Borderline hyperlipidemia  -     Lipid panel; Future    Screening mammogram, encounter for  -     Mammo screening bilateral w 3d & cad; Future  -     Ambulatory referral to Obstetrics / Gynecology; Future          Subjective:    Patient ID: Pihllip Teresa is a 51 y o  female    51yo F presents to the office for annual exam and f/u of palpitations/lightheadedness/generalized weakness    1) Annual exam   - PMHx: Hypotesion, Migraine/Lightheadedness, Palpitations and PVCs, Anemia, DODIE, combined allergic/non-allergic rhinitis   - allergies: PCN - rash, Ragweed   - Meds: Fiorecet PRN, Folic Acid, Vit S42, Vit C, Vit D, Ca-D  - PSHx: wisdom teeth   - Fhx: M(HL, renal/ureter ca), F (HL, HTN, thyroid disease), B (HTN, stroke at 39), MGF (renal ca), P/MGM (TIA), PGF (leukemia); no Fhx of autoimmune disorders   - Immunizations: does not get Flu vaccines, last Tdap 8/2016   - GYN Hx: last PAP last year (denies h/o abnml PAP), FDLMP: 2/26, gets monthly   - Hm:  last Mammo was 2yrs ago  - diet/exercise: not much exercise, diet: eliminating sugar/salt, high protein diet, "eating )   - social: denies Tob/illicits/EtOH; works with contracts for [employer]   - sexual Hx: sexually active with wife, no BC   - ROS: today in the office pt denies F/C/N/V/HA/visual changes/SOB/wheezing/abd pain/D/C/numbness or tingling in b/l UE+LE/LE edema or calf tenderness  2) Palpitations/lightheadedness/generalized weakness  - had the stomach bug in Dec and again in Jan - appears that an acute illness precipitates these events and for the past 3yrs have always happened in Jan   - went to the [facility] 12/12/2018 had a nml work (orthostatics, EKG, CBC, CMP, UA, CT head) and was d/c'd   - did see Endo - nml labs - no need for f/u (but does have an appt scheduled on 4/8 for a 2nd opinion)   - did see Cardio (Dr Claudetta Ego) for a 2nd opinion - was unable to get a Holter prior to OV - but per Cardio c/s - doubt this is cardiac in nature   - normal Tilts - no Neuro f/u scheduled  - did see a "Functional Medicine" doctor - has a script for EBV/CMV and lyme panel   - did go to Allergist (Dr Aurora Garcia) in 11/2018 - has an allergy to Ragweed       The following portions of the patient's history were reviewed and updated as appropriate: allergies, current medications, past family history, past medical history, past social history, past surgical history and problem list     Review of Systems  as per HPI    Objective:  /80   Pulse 81   Resp 18   Ht 5' 7" (1 702 m)   Wt 76 8 kg (169 lb 6 4 oz)   SpO2 98%   BMI 26 53 kg/m²    Physical Exam   Constitutional: She is oriented to person, place, and time  She appears well-developed and well-nourished  She is active  No distress     HENT:   Head: Normocephalic and atraumatic  Right Ear: Tympanic membrane, external ear and ear canal normal  No drainage, swelling or tenderness  No middle ear effusion  Left Ear: Tympanic membrane, external ear and ear canal normal  No drainage, swelling or tenderness  No middle ear effusion  Nose: Nose normal  No mucosal edema, rhinorrhea or septal deviation  Right sinus exhibits no maxillary sinus tenderness and no frontal sinus tenderness  Left sinus exhibits no maxillary sinus tenderness and no frontal sinus tenderness  Mouth/Throat: Uvula is midline, oropharynx is clear and moist and mucous membranes are normal  No oral lesions  Normal dentition  No uvula swelling  No oropharyngeal exudate, posterior oropharyngeal edema, posterior oropharyngeal erythema or tonsillar abscesses  Eyes: Pupils are equal, round, and reactive to light  Conjunctivae, EOM and lids are normal  No scleral icterus  Neck: Trachea normal and normal range of motion  Neck supple  No tracheal deviation present  Cardiovascular: Normal rate, regular rhythm, S1 normal, S2 normal and normal heart sounds  Exam reveals no gallop and no friction rub  No murmur heard  Pulmonary/Chest: Effort normal and breath sounds normal  No accessory muscle usage  No respiratory distress  She has no wheezes  She has no rhonchi  She has no rales  Abdominal: Soft  Normal appearance and bowel sounds are normal  She exhibits no distension  There is no hepatosplenomegaly  There is no tenderness  There is no CVA tenderness  Musculoskeletal: Normal range of motion  She exhibits no edema, tenderness or deformity  Lymphadenopathy:     She has no cervical adenopathy  Neurological: She is alert and oriented to person, place, and time  She has normal strength  No sensory deficit  Coordination and gait normal    Skin: Skin is warm  No rash noted  She is not diaphoretic  No erythema  No pallor  Psychiatric: She has a normal mood and affect   Her speech is normal and behavior is normal  Judgment normal  Cognition and memory are normal    Vitals reviewed

## 2019-06-20 DIAGNOSIS — F41.1 GAD (GENERALIZED ANXIETY DISORDER): ICD-10-CM

## 2019-06-20 RX ORDER — ESCITALOPRAM OXALATE 10 MG/1
TABLET ORAL
Qty: 90 TABLET | Refills: 0 | OUTPATIENT
Start: 2019-06-20 | End: 2021-02-03

## 2021-02-02 ENCOUNTER — HOSPITAL ENCOUNTER (OUTPATIENT)
Facility: HOSPITAL | Age: 51
Setting detail: OBSERVATION
Discharge: HOME | End: 2021-02-03
Attending: EMERGENCY MEDICINE | Admitting: INTERNAL MEDICINE
Payer: COMMERCIAL

## 2021-02-02 ENCOUNTER — APPOINTMENT (EMERGENCY)
Dept: RADIOLOGY | Facility: HOSPITAL | Age: 51
End: 2021-02-02
Payer: COMMERCIAL

## 2021-02-02 DIAGNOSIS — R52 PAIN: ICD-10-CM

## 2021-02-02 DIAGNOSIS — R07.9 CHEST PAIN: Primary | ICD-10-CM

## 2021-02-02 DIAGNOSIS — R12 HEARTBURN: ICD-10-CM

## 2021-02-02 LAB
ALBUMIN SERPL BCP-MCNC: 3.9 G/DL (ref 3.5–5)
ALP SERPL-CCNC: 68 U/L (ref 46–116)
ALT SERPL W P-5'-P-CCNC: 22 U/L (ref 12–78)
ANION GAP SERPL CALCULATED.3IONS-SCNC: 7 MMOL/L (ref 4–13)
AST SERPL W P-5'-P-CCNC: 16 U/L (ref 5–45)
ATRIAL RATE: 77 BPM
BASOPHILS # BLD AUTO: 0.03 THOUSANDS/ΜL (ref 0–0.1)
BASOPHILS NFR BLD AUTO: 0 % (ref 0–1)
BILIRUB SERPL-MCNC: 0.21 MG/DL (ref 0.2–1)
BUN SERPL-MCNC: 17 MG/DL (ref 5–25)
CALCIUM SERPL-MCNC: 8.8 MG/DL (ref 8.3–10.1)
CHLORIDE SERPL-SCNC: 104 MMOL/L (ref 100–108)
CO2 SERPL-SCNC: 30 MMOL/L (ref 21–32)
CREAT SERPL-MCNC: 1.03 MG/DL (ref 0.6–1.3)
D DIMER PPP FEU-MCNC: <0.27 UG/ML FEU
EOSINOPHIL # BLD AUTO: 0.08 THOUSAND/ΜL (ref 0–0.61)
EOSINOPHIL NFR BLD AUTO: 1 % (ref 0–6)
ERYTHROCYTE [DISTWIDTH] IN BLOOD BY AUTOMATED COUNT: 11.9 % (ref 11.6–15.1)
GFR SERPL CREATININE-BSD FRML MDRD: 63 ML/MIN/1.73SQ M
GLUCOSE SERPL-MCNC: 100 MG/DL (ref 65–140)
HCT VFR BLD AUTO: 40.4 % (ref 34.8–46.1)
HGB BLD-MCNC: 13.7 G/DL (ref 11.5–15.4)
IMM GRANULOCYTES # BLD AUTO: 0.02 THOUSAND/UL (ref 0–0.2)
IMM GRANULOCYTES NFR BLD AUTO: 0 % (ref 0–2)
LYMPHOCYTES # BLD AUTO: 2.3 THOUSANDS/ΜL (ref 0.6–4.47)
LYMPHOCYTES NFR BLD AUTO: 32 % (ref 14–44)
MCH RBC QN AUTO: 30.9 PG (ref 26.8–34.3)
MCHC RBC AUTO-ENTMCNC: 33.9 G/DL (ref 31.4–37.4)
MCV RBC AUTO: 91 FL (ref 82–98)
MONOCYTES # BLD AUTO: 0.8 THOUSAND/ΜL (ref 0.17–1.22)
MONOCYTES NFR BLD AUTO: 11 % (ref 4–12)
NEUTROPHILS # BLD AUTO: 4.06 THOUSANDS/ΜL (ref 1.85–7.62)
NEUTS SEG NFR BLD AUTO: 56 % (ref 43–75)
NRBC BLD AUTO-RTO: 0 /100 WBCS
P AXIS: 65 DEGREES
PLATELET # BLD AUTO: 201 THOUSANDS/UL (ref 149–390)
PMV BLD AUTO: 9.5 FL (ref 8.9–12.7)
POTASSIUM SERPL-SCNC: 3.5 MMOL/L (ref 3.5–5.3)
PR INTERVAL: 138 MS
PROT SERPL-MCNC: 7.2 G/DL (ref 6.4–8.2)
QRS AXIS: 51 DEGREES
QRSD INTERVAL: 70 MS
QT INTERVAL: 362 MS
QTC INTERVAL: 402 MS
RBC # BLD AUTO: 4.43 MILLION/UL (ref 3.81–5.12)
SODIUM SERPL-SCNC: 141 MMOL/L (ref 136–145)
T WAVE AXIS: -38 DEGREES
TROPONIN I SERPL-MCNC: <0.02 NG/ML
VENTRICULAR RATE: 74 BPM
WBC # BLD AUTO: 7.29 THOUSAND/UL (ref 4.31–10.16)

## 2021-02-02 PROCEDURE — 85025 COMPLETE CBC W/AUTO DIFF WBC: CPT | Performed by: EMERGENCY MEDICINE

## 2021-02-02 PROCEDURE — 85379 FIBRIN DEGRADATION QUANT: CPT | Performed by: EMERGENCY MEDICINE

## 2021-02-02 PROCEDURE — 71045 X-RAY EXAM CHEST 1 VIEW: CPT

## 2021-02-02 PROCEDURE — 99285 EMERGENCY DEPT VISIT HI MDM: CPT

## 2021-02-02 PROCEDURE — 93005 ELECTROCARDIOGRAM TRACING: CPT

## 2021-02-02 PROCEDURE — 93010 ELECTROCARDIOGRAM REPORT: CPT | Performed by: INTERNAL MEDICINE

## 2021-02-02 PROCEDURE — 84484 ASSAY OF TROPONIN QUANT: CPT | Performed by: EMERGENCY MEDICINE

## 2021-02-02 PROCEDURE — 99285 EMERGENCY DEPT VISIT HI MDM: CPT | Performed by: EMERGENCY MEDICINE

## 2021-02-02 PROCEDURE — 36415 COLL VENOUS BLD VENIPUNCTURE: CPT

## 2021-02-02 PROCEDURE — 80053 COMPREHEN METABOLIC PANEL: CPT | Performed by: EMERGENCY MEDICINE

## 2021-02-02 RX ORDER — ASPIRIN 325 MG
325 TABLET ORAL ONCE
Status: COMPLETED | OUTPATIENT
Start: 2021-02-02 | End: 2021-02-02

## 2021-02-02 RX ADMIN — ASPIRIN 325 MG ORAL TABLET 325 MG: 325 PILL ORAL at 22:07

## 2021-02-02 NOTE — Clinical Note
Tomasz Sepulveda was seen and treated in our emergency department on 2/2/2021  Diagnosis:     Tony Guevara    She may return on this date: 02/04/2021         If you have any questions or concerns, please don't hesitate to call        Billy Stephenson RN    ______________________________           _______________          _______________  Hospital Representative                              Date                                Time

## 2021-02-03 ENCOUNTER — APPOINTMENT (OUTPATIENT)
Dept: NON INVASIVE DIAGNOSTICS | Facility: HOSPITAL | Age: 51
End: 2021-02-03
Payer: COMMERCIAL

## 2021-02-03 ENCOUNTER — APPOINTMENT (OUTPATIENT)
Dept: NON INVASIVE DIAGNOSTICS | Facility: HOSPITAL | Age: 51
End: 2021-02-03
Attending: INTERNAL MEDICINE
Payer: COMMERCIAL

## 2021-02-03 VITALS
RESPIRATION RATE: 12 BRPM | TEMPERATURE: 98.8 F | DIASTOLIC BLOOD PRESSURE: 71 MMHG | OXYGEN SATURATION: 98 % | SYSTOLIC BLOOD PRESSURE: 130 MMHG | HEART RATE: 73 BPM

## 2021-02-03 PROBLEM — R07.9 CHEST PAIN: Status: ACTIVE | Noted: 2021-02-03

## 2021-02-03 PROBLEM — Z86.79 HISTORY OF ORTHOSTATIC HYPOTENSION: Status: ACTIVE | Noted: 2018-02-07

## 2021-02-03 LAB
CHOLEST SERPL-MCNC: 187 MG/DL (ref 50–200)
CRP SERPL QL: <3 MG/L
HDLC SERPL-MCNC: 86 MG/DL
LDLC SERPL CALC-MCNC: 94 MG/DL (ref 0–100)
NONHDLC SERPL-MCNC: 101 MG/DL
TRIGL SERPL-MCNC: 34 MG/DL
TROPONIN I SERPL-MCNC: <0.02 NG/ML

## 2021-02-03 PROCEDURE — 93351 STRESS TTE COMPLETE: CPT | Performed by: INTERNAL MEDICINE

## 2021-02-03 PROCEDURE — 99214 OFFICE O/P EST MOD 30 MIN: CPT | Performed by: INTERNAL MEDICINE

## 2021-02-03 PROCEDURE — 93005 ELECTROCARDIOGRAM TRACING: CPT

## 2021-02-03 PROCEDURE — 93350 STRESS TTE ONLY: CPT

## 2021-02-03 PROCEDURE — 99236 HOSP IP/OBS SAME DATE HI 85: CPT | Performed by: INTERNAL MEDICINE

## 2021-02-03 PROCEDURE — 84484 ASSAY OF TROPONIN QUANT: CPT | Performed by: INTERNAL MEDICINE

## 2021-02-03 PROCEDURE — 84484 ASSAY OF TROPONIN QUANT: CPT | Performed by: EMERGENCY MEDICINE

## 2021-02-03 PROCEDURE — 93306 TTE W/DOPPLER COMPLETE: CPT | Performed by: INTERNAL MEDICINE

## 2021-02-03 PROCEDURE — 86140 C-REACTIVE PROTEIN: CPT | Performed by: PHYSICIAN ASSISTANT

## 2021-02-03 PROCEDURE — 80061 LIPID PANEL: CPT | Performed by: PHYSICIAN ASSISTANT

## 2021-02-03 PROCEDURE — 36415 COLL VENOUS BLD VENIPUNCTURE: CPT | Performed by: EMERGENCY MEDICINE

## 2021-02-03 PROCEDURE — 93306 TTE W/DOPPLER COMPLETE: CPT

## 2021-02-03 RX ORDER — MAGNESIUM HYDROXIDE/ALUMINUM HYDROXICE/SIMETHICONE 120; 1200; 1200 MG/30ML; MG/30ML; MG/30ML
30 SUSPENSION ORAL EVERY 6 HOURS PRN
Qty: 355 ML | Refills: 0 | Status: SHIPPED | OUTPATIENT
Start: 2021-02-03

## 2021-02-03 RX ORDER — ACETAMINOPHEN 325 MG/1
650 TABLET ORAL EVERY 6 HOURS PRN
Refills: 0
Start: 2021-02-03

## 2021-02-03 RX ORDER — ACETAMINOPHEN 325 MG/1
650 TABLET ORAL EVERY 6 HOURS PRN
Status: DISCONTINUED | OUTPATIENT
Start: 2021-02-03 | End: 2021-02-03 | Stop reason: HOSPADM

## 2021-02-03 RX ORDER — MAGNESIUM HYDROXIDE/ALUMINUM HYDROXICE/SIMETHICONE 120; 1200; 1200 MG/30ML; MG/30ML; MG/30ML
30 SUSPENSION ORAL EVERY 6 HOURS PRN
Status: DISCONTINUED | OUTPATIENT
Start: 2021-02-03 | End: 2021-02-03 | Stop reason: HOSPADM

## 2021-02-03 RX ADMIN — ALUMINUM HYDROXIDE, MAGNESIUM HYDROXIDE, AND SIMETHICONE 30 ML: 200; 200; 20 SUSPENSION ORAL at 02:29

## 2021-02-03 RX ADMIN — ACETAMINOPHEN 650 MG: 325 TABLET, FILM COATED ORAL at 02:30

## 2021-02-03 NOTE — ASSESSMENT & PLAN NOTE
· Not currently taking midodrine  Reports intermittent lightheadedness but denies recent worsening  · BP stable  Continue to monitor

## 2021-02-03 NOTE — ED PROVIDER NOTES
History  Chief Complaint   Patient presents with    Chest Pain     woke up this morning with stabbing chest pains  reports pains have been intermittetn thorughout the day  reports worse with laughing and movement  HPI    Patient is a pleasant 45 yof who presents with chest pain  Denies history of htn/hld/dmii  Denies FH heart disease  Denies smoking  Chest pain is intermittent  Sharp  Worse with inspiration  No radiation to the back  No tearing pain going to the back  Normal bilat UE pulses  No history of PE  No new unilateral leg swelling  Non exertional  No sweats  No right sided pain  No vomiting  No fever or cough to suggest pneumonia  No  vomiting or subcue crepitus  Equal breath sounds  No skin rash  No new murmur to suggest symptomatic valvular stenosis/ regurg or ruptured chordae  Vitals do not suggest tamponade  No palpable crepitus on exam    No recent viral syndromes to suggest Natalie/Myocarditis  MDM pleasant 45 yof, significant t wave abnormalities on EKG, given this and age, will admit for CP obs  Prior to Admission Medications   Prescriptions Last Dose Informant Patient Reported? Taking?    Ascorbic Acid (VITAMIN C) 1000 MG tablet  Self Yes No   Sig: Take 1,000 mg by mouth daily   Calcium Carbonate-Vitamin D (CALCIUM 500 + D PO)  Self Yes No   Sig: Take by mouth   Cholecalciferol (VITAMIN D3) 5000 units TABS  Self Yes No   Sig: Take 5,000 Units by mouth daily   Cyanocobalamin (VITAMIN B-12) 3000 MCG SUBL   Yes No   Sig: Place under the tongue daily   Specialty Vitamins Products (MAGNESIUM, AMINO ACID CHELATE,) 133 MG tablet  Self Yes No   Sig: Take 1 tablet by mouth 2 (two) times a day   butalbital-acetaminophen-caffeine (FIORICET,ESGIC) -40 mg per tablet  Self Yes No   Sig: TAKE 1 TABLET BY MOUTH EVERY 6 HOURS AS NEEDED FOR HEADACHE   escitalopram (LEXAPRO) 10 mg tablet   No No   Sig: TAKE 1 TABLET BY MOUTH EVERY DAY Facility-Administered Medications: None       Past Medical History:   Diagnosis Date    Anxiety     Dermatitis 02/15/2007    LAST ASSESSED: 2/15/07    Low blood pressure     Migraines     Palpitations        Past Surgical History:   Procedure Laterality Date    NO PAST SURGERIES         Family History   Problem Relation Age of Onset    Kidney cancer Mother 61        Ureter Ca    Hyperlipidemia Mother     Heart attack Maternal Grandmother [de-identified]    Transient ischemic attack Maternal Grandmother     Kidney cancer Maternal Grandfather     Colon cancer Maternal Grandfather [de-identified]    Leukemia Paternal Grandfather     Hypertension Father     Hyperlipidemia Father     Thyroid disease Father     Hypertension Brother     Stroke Brother 40    Transient ischemic attack Paternal Grandmother     Heart disease Paternal Grandmother      I have reviewed and agree with the history as documented  E-Cigarette/Vaping     E-Cigarette/Vaping Substances     Social History     Tobacco Use    Smoking status: Never Smoker    Smokeless tobacco: Never Used   Substance Use Topics    Alcohol use: No    Drug use: No       Review of Systems   Cardiovascular: Positive for chest pain  All other systems reviewed and are negative  Physical Exam  Physical Exam  Vitals signs and nursing note reviewed  Constitutional:       Appearance: She is well-developed  HENT:      Head: Normocephalic and atraumatic  Right Ear: External ear normal       Left Ear: External ear normal    Eyes:      Conjunctiva/sclera: Conjunctivae normal    Neck:      Musculoskeletal: Normal range of motion and neck supple  Vascular: No JVD  Trachea: No tracheal deviation  Cardiovascular:      Rate and Rhythm: Normal rate and regular rhythm  Heart sounds: Normal heart sounds  Pulmonary:      Effort: Pulmonary effort is normal  No respiratory distress  Breath sounds: No wheezing or rales     Abdominal:      Palpations: Abdomen is soft  Tenderness: There is no abdominal tenderness  There is no guarding or rebound  Musculoskeletal:         General: No tenderness  Skin:     General: Skin is warm and dry  Findings: No erythema or rash  Neurological:      General: No focal deficit present  Mental Status: She is alert and oriented to person, place, and time  Motor: No weakness  Psychiatric:         Behavior: Behavior normal          Thought Content:  Thought content normal          Vital Signs  ED Triage Vitals [02/02/21 2124]   Temperature Pulse Respirations Blood Pressure SpO2   98 8 °F (37 1 °C) 84 18 164/97 99 %      Temp src Heart Rate Source Patient Position - Orthostatic VS BP Location FiO2 (%)   -- -- -- -- --      Pain Score       No Pain           Vitals:    02/02/21 2124   BP: 164/97   Pulse: 84         Visual Acuity      ED Medications  Medications   aspirin tablet 325 mg (325 mg Oral Given 2/2/21 2207)       Diagnostic Studies  Results Reviewed     Procedure Component Value Units Date/Time    D-dimer, quantitative [[id number]]  (Normal) Collected: 02/02/21 2122    Lab Status: Final result Specimen: Blood from Arm, Right Updated: 02/02/21 2335     D-Dimer, Quant <0 27 ug/ml FEU     Troponin I [[id number]]     Lab Status: No result Specimen: Blood     Troponin I [[id number]]  (Normal) Collected: 02/02/21 2122    Lab Status: Final result Specimen: Blood from Arm, Right Updated: 02/02/21 2147     Troponin I <0 02 ng/mL     Comprehensive metabolic panel [[id number]] Collected: 02/02/21 2122    Lab Status: Final result Specimen: Blood from Arm, Right Updated: 02/02/21 2144     Sodium 141 mmol/L      Potassium 3 5 mmol/L      Chloride 104 mmol/L      CO2 30 mmol/L      ANION GAP 7 mmol/L      BUN 17 mg/dL      Creatinine 1 03 mg/dL      Glucose 100 mg/dL      Calcium 8 8 mg/dL      AST 16 U/L      ALT 22 U/L      Alkaline Phosphatase 68 U/L      Total Protein 7 2 g/dL      Albumin 3 9 g/dL      Total Bilirubin 0 21 mg/dL      eGFR 63 ml/min/1 73sq m     Narrative:      [place] guidelines for Chronic Kidney Disease (CKD):     Stage 1 with normal or high GFR (GFR > 90 mL/min/1 73 square meters)    Stage 2 Mild CKD (GFR = 60-89 mL/min/1 73 square meters)    Stage 3A Moderate CKD (GFR = 45-59 mL/min/1 73 square meters)    Stage 3B Moderate CKD (GFR = 30-44 mL/min/1 73 square meters)    Stage 4 Severe CKD (GFR = 15-29 mL/min/1 73 square meters)    Stage 5 End Stage CKD (GFR <15 mL/min/1 73 square meters)  Note: GFR calculation is accurate only with a steady state creatinine    CBC and differential [[id number]] Collected: 02/02/21 2122    Lab Status: Final result Specimen: Blood from Arm, Right Updated: 02/02/21 2127     WBC 7 29 Thousand/uL      RBC 4 43 Million/uL      Hemoglobin 13 7 g/dL      Hematocrit 40 4 %      MCV 91 fL      MCH 30 9 pg      MCHC 33 9 g/dL      RDW 11 9 %      MPV 9 5 fL      Platelets 915 Thousands/uL      nRBC 0 /100 WBCs      Neutrophils Relative 56 %      Immat GRANS % 0 %      Lymphocytes Relative 32 %      Monocytes Relative 11 %      Eosinophils Relative 1 %      Basophils Relative 0 %      Neutrophils Absolute 4 06 Thousands/µL      Immature Grans Absolute 0 02 Thousand/uL      Lymphocytes Absolute 2 30 Thousands/µL      Monocytes Absolute 0 80 Thousand/µL      Eosinophils Absolute 0 08 Thousand/µL      Basophils Absolute 0 03 Thousands/µL                  XR chest 1 view portable    (Results Pending)              Procedures  Procedures         ED Course             HEART Risk Score      Most Recent Value   Heart Score Risk Calculator   History  1 Filed at: 02/02/2021 2230   ECG  2 Filed at: 02/02/2021 2230   Age  1 Filed at: 02/02/2021 2230   Risk Factors  0 Filed at: 02/02/2021 2230   Troponin  0 Filed at: 02/02/2021 2230   HEART Score  4 Filed at: 02/02/2021 2230                                    MDM    Disposition  Final diagnoses:   Chest pain     Time reflects when diagnosis was documented in both MDM as applicable and the Disposition within this note     Time User Action Codes Description Comment    2/3/2021 12:30 AM Radu Arenas, [address] [R07 9] Chest pain       ED Disposition     ED Disposition Condition Date/Time Comment    Admit Stable Wed Feb 3, 2021 12:30 AM Case was discussed with jennifer Langford and the patient's admission status was agreed to be Admission Status: observation status to the service of Dr Kaycee Chan   Follow-up Information    None         Patient's Medications   Discharge Prescriptions    No medications on file     No discharge procedures on file      PDMP Review     None          ED Provider  Electronically Signed by           Keno Salazar MD  02/03/21 0030

## 2021-02-03 NOTE — ASSESSMENT & PLAN NOTE
Blood Pressure: 120/73    · Patient has as hx of hypotension associated with light headness and was on midodrine in 2015  She had a cardiac workup that was benign and discontinued the midodrine in 2016 due to lack of improvement on treatment  · Tilt test done in 2018 was negative  Plan:  · Patient has stable BP now  Continue to monitor

## 2021-02-03 NOTE — ASSESSMENT & PLAN NOTE
· Resolved  · Presented with stabbing chest pain, worsened by movement and lying flat  · FREDO score: 0  · EKG: NSR, T wave abnormalities  · CXR with no acute findings noted  · Troponins negative  · Status post telemetry:  No significant arrhythmias  · Cardiologist on board:  From my discussion with him, stress echocardiogram and echocardiogram are essentially normal   Thus patient's previous chest pain was noncardiac  According to the cardiologist, he suspects musculoskeletal as she points to the costochondral junction at the left sternal and was worse with movement  · Patient may have Tylenol on as needed basis  I also told the patient that she may have Mylanta if she has any heartburns  · Outpatient follow-up with primary care physician

## 2021-02-03 NOTE — UTILIZATION REVIEW
Initial Clinical Review    Admission: Date/Time/Statement:   Admission Orders (From admission, onward)     Ordered        02/03/21 0029  Place in Observation  Once                   Orders Placed This Encounter   Procedures    Place in Observation     Standing Status:   Standing     Number of Occurrences:   1     Order Specific Question:   Level of Care     Answer:   Med Surg [16]     ED Arrival Information     Expected Arrival Acuity Means of Arrival Escorted By Service Admission Type    - 2/2/2021 21:12 [facility]    Arrival Complaint    chest pain        Chief Complaint   Patient presents with    Chest Pain     woke up this morning with stabbing chest pains  reports pains have been intermittetn thorughout the day  reports worse with laughing and movement  Assessment/Plan: this is a 45year old female from home to ED on 2/2/2021 and observation order placed 2/3/2021   due to chest pain  History of anxiety, orthostatic hypotension  Presented due to intermittent chest pain through out day , worse with inspiration starting on morning of arrival upon awakening  Exam is non focal   Heart score 4  FREDO 0  Troponin < 0 02   D dimer < 0 27  EKG sinus with T abnormality  Plan is telemetry, trend troponin, echo    Consult cardiology     ED Triage Vitals   Temperature Pulse Respirations Blood Pressure SpO2   02/02/21 2124 02/02/21 2124 02/02/21 2124 02/02/21 2124 02/02/21 2124   98 8 °F (37 1 °C) 84 18 164/97 99 %      Temp src Heart Rate Source Patient Position - Orthostatic VS BP Location FiO2 (%)   -- 02/03/21 0753 02/03/21 0753 02/03/21 0753 --    Monitor Lying Left arm       Pain Score       02/02/21 2124       No Pain          Wt Readings from Last 1 Encounters:   03/20/19 76 8 kg (169 lb 6 4 oz)     Additional Vital Signs:   02/03/21 0753  --  68  16  120/73  --  98 %  None (Room air)  Lying   02/03/21 0600  --  60  16  105/66  81  98 %  --  --   02/03/21 0400  --  60  16 119/78  94  96 %  --  --   02/03/21 0150  --  60  16  119/78  --  100            Pertinent Labs/Diagnostic Test Results:   2/2/2021 CxR No acute cardiopulmonary disease      2/2/2021 EKG Normal sinus rhythm  RSR' or QR pattern in V1 suggests right ventricular conduction delay  Cannot rule out Anterior infarct , age undetermined  T wave abnormality, consider inferolateral ischemia  Abnormal ECG  When compared with ECG of 12-DEC-2018 10:43,  T wave inversion more evident in Inferior leads  T wave inversion now evident in Anterolateral leads    Results from last 7 days   Lab Units 02/02/21  2122   WBC Thousand/uL 7 29   HEMOGLOBIN g/dL 13 7   HEMATOCRIT % 40 4   PLATELETS Thousands/uL 201   NEUTROS ABS Thousands/µL 4 06     Results from last 7 days   Lab Units 02/02/21  2122   SODIUM mmol/L 141   POTASSIUM mmol/L 3 5   CHLORIDE mmol/L 104   CO2 mmol/L 30   ANION GAP mmol/L 7   BUN mg/dL 17   CREATININE mg/dL 1 03   EGFR ml/min/1 73sq m 63   CALCIUM mg/dL 8 8     Results from last 7 days   Lab Units 02/02/21  2122   AST U/L 16   ALT U/L 22   ALK PHOS U/L 68   TOTAL PROTEIN g/dL 7 2   ALBUMIN g/dL 3 9   TOTAL BILIRUBIN mg/dL 0 21     Results from last 7 days   Lab Units 02/02/21  2122   GLUCOSE RANDOM mg/dL 100     Results from last 7 days   Lab Units 02/03/21  0754 02/03/21  0441 02/03/21  0147 02/02/21  2122   TROPONIN I ng/mL <0 02 <0 02 <0 02 <0 02     Results from last 7 days   Lab Units 02/02/21  2122   D-DIMER QUANTITATIVE ug/ml FEU <0 27     Results from last 7 days   Lab Units 02/03/21  0441   CRP mg/L <3 0       ED Treatment:   Medication Administration from 02/02/2021 2112 to 02/03/2021 1326       Date/Time Order Dose Route Action Comments     02/02/2021 2207 aspirin tablet 325 mg 325 mg Oral Given      02/03/2021 0230 acetaminophen (TYLENOL) tablet 650 mg 650 mg Oral Given      02/03/2021 0229 aluminum-magnesium hydroxide-simethicone (MYLANTA) oral suspension 30 mL 30 mL Oral Given         Past Medical History:   Diagnosis Date    Anxiety     Dermatitis 02/15/2007    LAST ASSESSED: 2/15/07    Low blood pressure     Migraines     Palpitations      Present on Admission:   Anxiety      Admitting Diagnosis: Chest pain [R07 9]  Age/Sex: 45 y o  female  Admission Orders:2/3/2021 0029 observation   Scheduled Medications:  enoxaparin, 40 mg, Subcutaneous, Daily      Continuous IV Infusions:     PRN Meds:  acetaminophen, 650 mg, Oral, Q6H PRN - used x 1  aluminum-magnesium hydroxide-simethicone, 30 mL, Oral, Q6H PRN - used x 1       Telemetry   IP CONSULT TO CARDIOLOGY    Network Utilization Review Department  ATTENTION: Please call with any questions or concerns to [phone] and carefully listen to the prompts so that you are directed to the right person  All voicemails are confidential   Ivon Martinez all requests for admission clinical reviews, approved or denied determinations and any other requests to dedicated fax number below belonging to the campus where the patient is receiving treatment   List of dedicated fax numbers for the Facilities:  [address] DENIALS (Administrative/Medical Necessity) [phone]   [address] (Maternity/NICU/Pediatrics) [phone]   [address] Michelle Martinez 5197 ([address] Shane "Mireya" 103) [id number] [place] [address] [address] [place] [address] [phone]

## 2021-02-03 NOTE — H&P
H&P- Abe Bell 1970, 45 y o  female MRN: [id number]    Unit/Bed#: ED 17 Encounter: [id number]    Primary Care Provider: Calvert Cabot, DO   Date and time admitted to hospital: 2/2/2021  9:51 PM        * Chest pain  Assessment & Plan  · Presented with stabbing chest pain, worsened by movement and lying flat  · R/o ACS vs pericarditis  · FREDO score: 0  · EKG: NSR, T wave abnormalities  · CXR with no acute findings noted  · Follow-up on official results in AM    · Troponin negative x 1    · Continue to trend  · Monitor on telemetry  · Obtain cardiology consult given abnormalities on EKG  · Obtain echocardiogram   · Obtain lipid panel  Anxiety  Assessment & Plan  · Not currently taking any prescription medications  History of orthostatic hypotension  Assessment & Plan  · Not currently taking midodrine  Reports intermittent lightheadedness but denies recent worsening  · BP stable  Continue to monitor  VTE Prophylaxis: Enoxaparin (Lovenox)  / sequential compression device   Code Status: level 1- full code  POLST: POLST form is not discussed and not completed at this time  Anticipated Length of Stay:  Patient will be admitted on an Observation basis with an anticipated length of stay of less than 2 midnights  Justification for Hospital Stay: chest pain    Total Time for Visit, including Counseling / Coordination of Care: 45 minutes  Greater than 50% of this total time spent on direct patient counseling and coordination of care  Chief Complaint:   Chest pain     History of Present Illness:    Abe Bell is a 45 y o  female who presents with chest pain   Patient reports that yesterday morning when she awoke, she has some pain in her left shoulder, upper back and left side of her chest which she attributed to her muscles being "seized up " She reports that this pain improved after getting out of bed but notes that intermittently throughout the day she was having a sharp pain in the center of her chest without radiation  She notes that the pain was occurring when she would laugh, cough or take a deep breath  She states that tonight she was lying down and had increased pain which prompted her to come to the hospital for evaluation  She reports that when she sat up her pain improved but has still been having brief episodes of the pain since coming to the ED  She denies SOB, diaphoresis and denies any recent illnesses, fevers/ chills or cough  She states that she has had similar pain in the past but the episodes were brief, lasting only a few seconds and occurring about 3 times per year  Patient reports that 2 weeks ago she fell on ice and landed on her bottom but since then has been having pain on the left side of her upper back and shoulder for which she was prescribed 5 days of prednisone which she completed yesterday  Review of Systems:    Review of Systems   Constitutional: Negative for appetite change, chills and fever  Respiratory: Negative for cough and shortness of breath  Cardiovascular: Positive for chest pain  Negative for palpitations and leg swelling  Gastrointestinal: Negative for abdominal pain, nausea and vomiting  Musculoskeletal: Positive for back pain  Neurological: Positive for light-headedness (intermittent at baseline)  All other systems reviewed and are negative  Past Medical and Surgical History:     Past Medical History:   Diagnosis Date    Anxiety     Dermatitis 02/15/2007    LAST ASSESSED: 2/15/07    Low blood pressure     Migraines     Palpitations        Past Surgical History:   Procedure Laterality Date    NO PAST SURGERIES         Meds/Allergies:    Prior to Admission medications    Medication Sig Start Date End Date Taking?  Authorizing Provider   Ascorbic Acid (VITAMIN C) 1000 MG tablet Take 1,000 mg by mouth daily   Yes Historical Provider, MD   Calcium Carbonate-Vitamin D (CALCIUM 500 + D PO) Take by mouth   Yes Historical Provider, MD Cholecalciferol (VITAMIN D3) 5000 units TABS Take 5,000 Units by mouth daily   Yes Historical Provider, MD   Cyanocobalamin (VITAMIN B-12) 3000 MCG SUBL Place under the tongue daily   Yes Historical Provider, MD   Specialty Vitamins Products (MAGNESIUM, AMINO ACID CHELATE,) 133 MG tablet Take 1 tablet by mouth 2 (two) times a day   Yes Historical Provider, MD   butalbital-acetaminophen-caffeine (FIORICET,ESGIC) -40 mg per tablet TAKE 1 TABLET BY MOUTH EVERY 6 HOURS AS NEEDED FOR HEADACHE 2/1/18   Historical Provider, MD   escitalopram (LEXAPRO) 10 mg tablet TAKE 1 TABLET BY MOUTH EVERY DAY  Patient not taking: Reported on 2/3/2021 6/20/19   Karis Sanchez, DO     I have reviewed home medications with patient personally  Allergies:    Allergies   Allergen Reactions    Penicillins Rash       Social History:     Marital Status: /Civil Union   Patient Pre-hospital Living Situation: home  Patient Pre-hospital Level of Mobility: independent  Patient Pre-hospital Diet Restrictions: none  Substance Use History:   Social History     Substance and Sexual Activity   Alcohol Use No     Social History     Tobacco Use   Smoking Status Never Smoker   Smokeless Tobacco Never Used     Social History     Substance and Sexual Activity   Drug Use No       Family History:    Family History   Problem Relation Age of Onset    Kidney cancer Mother 61        [place] Ca    Hyperlipidemia Mother     Heart attack Maternal Grandmother [de-identified]    Transient ischemic attack Maternal Grandmother     Kidney cancer Maternal Grandfather     Colon cancer Maternal Grandfather [de-identified]    Leukemia Paternal Grandfather     Hypertension Father     Hyperlipidemia Father     Thyroid disease Father     Hypertension Brother     Stroke Brother 40    Transient ischemic attack Paternal Grandmother     Heart disease Paternal Grandmother        Physical Exam:     Vitals:   Blood Pressure: 119/78 (02/03/21 0150)  Pulse: 60 (02/03/21 0150)  Temperature: 98 8 °F (37 1 °C) (02/02/21 2124)  Respirations: 16 (02/03/21 0150)  SpO2: 100 % (02/03/21 0150)    Physical Exam  Vitals signs and nursing note reviewed  Constitutional:       General: She is not in acute distress  Appearance: She is not diaphoretic  HENT:      Head: Normocephalic and atraumatic  Eyes:      Conjunctiva/sclera: Conjunctivae normal    Cardiovascular:      Rate and Rhythm: Normal rate and regular rhythm  Pulmonary:      Effort: Pulmonary effort is normal  No respiratory distress  Chest:      Chest wall: No tenderness  Abdominal:      General: Bowel sounds are normal       Palpations: Abdomen is soft  Tenderness: There is no abdominal tenderness  Musculoskeletal:         General: No swelling or tenderness  Skin:     General: Skin is warm and dry  Coloration: Skin is not pale  Neurological:      Mental Status: She is alert and oriented to person, place, and time  Psychiatric:         Mood and Affect: Mood normal          Behavior: Behavior normal            Additional Data:     Lab Results: I have personally reviewed pertinent reports  Results from last 7 days   Lab Units 02/02/21 2122   WBC Thousand/uL 7 29   HEMOGLOBIN g/dL 13 7   HEMATOCRIT % 40 4   PLATELETS Thousands/uL 201   NEUTROS PCT % 56   LYMPHS PCT % 32   MONOS PCT % 11   EOS PCT % 1     Results from last 7 days   Lab Units 02/02/21 2122   SODIUM mmol/L 141   POTASSIUM mmol/L 3 5   CHLORIDE mmol/L 104   CO2 mmol/L 30   BUN mg/dL 17   CREATININE mg/dL 1 03   ANION GAP mmol/L 7   CALCIUM mg/dL 8 8   ALBUMIN g/dL 3 9   TOTAL BILIRUBIN mg/dL 0 21   ALK PHOS U/L 68   ALT U/L 22   AST U/L 16   GLUCOSE RANDOM mg/dL 100                       Imaging: I have personally reviewed pertinent films in PACS    XR chest 1 view portable    (Results Pending)       EKG, Pathology, and Other Studies Reviewed on Admission:   · EKG: NSR, T wave abnormalities    Allscripts / Epic Records Reviewed:  Yes ** Please Note: This note has been constructed using a voice recognition system   **

## 2021-02-03 NOTE — DISCHARGE SUMMARY
Discharge- Aura Hodgkin 1970, 45 y o  female MRN: [id number]    Unit/Bed#: ED 17 Encounter: [id number]    Primary Care Provider: Francisca Knapp DO   Date and time admitted to hospital: 2/2/2021  9:51 PM        * Chest pain  Assessment & Plan  · Resolved  · Presented with stabbing chest pain, worsened by movement and lying flat  · FREDO score: 0  · EKG: NSR, T wave abnormalities  · CXR with no acute findings noted  · Troponins negative  · Status post telemetry:  No significant arrhythmias  · Cardiologist on board:  From my discussion with him, stress echocardiogram and echocardiogram are essentially normal   Thus patient's previous chest pain was noncardiac  According to the cardiologist, he suspects musculoskeletal as she points to the costochondral junction at the left sternal and was worse with movement  · Patient may have Tylenol on as needed basis  I also told the patient that she may have Mylanta if she has any heartburns  · Outpatient follow-up with primary care physician  Anxiety  Assessment & Plan  · Not currently taking any prescription medications  · Outpatient follow-up with primary care physician  History of orthostatic hypotension  Assessment & Plan  · Stable  · Not currently taking midodrine  Reports intermittent lightheadedness but denies recent worsening  · BP stable  · Outpatient follow-up with primary care physician  Discharging Physician / Practitioner: Carri Gomez MD  PCP: Francisca Knapp DO  Admission Date:   Admission Orders (From admission, onward)     Ordered        02/03/21 0029  Place in Observation  Once                   Discharge Date: 02/03/21        Consultations During Hospital Stay:  · Cardiologist     Procedures Performed:     · Please see diagnosis and notes above    Significant Findings / Test Results:     · Please see diagnosis and notes above  Incidental Findings:   · None      Test Results Pending at Discharge (will require follow up):   · None  Outpatient Tests Requested:  · None  Complications:  None  Reason for Admission:  Chest pain  Hospital Course:     Jyotsna Dial is a 45 y o  female patient who originally presented to the hospital on 2/2/2021 due to chest pain  Patient was admitted to rule out acute coronary syndrome  There were some EKG changes that may be ischemic in nature  Thus cardiologist was consulted  Troponins were done and they were negative  A stress echocardiogram and transthoracic echocardiogram were done  According to the cardiologist they were essentially normal   Patient's condition improved  Patient denies any more chest pains  As per cardiologist, patient's chest pain likely musculoskeletal in origin  For details about patient's diagnosis, workups, management, hospital course and discharge plans, please see above list of diagnoses and related notes  Condition at Discharge: stable     Discharge Day Visit / Exam:     Subjective:    Patient is doing fine and feels a lot better  In fact, patient denies any symptoms and does not have any complaints  Patient denies any chest pains or any other pains  Patient denies any shortness of breath  Patient is okay with her discharge to home today  Vitals: Blood Pressure: 130/71 (02/03/21 1357)  Pulse: 73 (02/03/21 1357)  Temperature: 98 8 °F (37 1 °C) (02/02/21 2124)  Respirations: 12 (02/03/21 1357)  SpO2: 98 % (02/03/21 1357)  Exam:   Physical Exam  Vitals signs and nursing note reviewed  Constitutional:       General: She is not in acute distress  Appearance: She is not ill-appearing, toxic-appearing or diaphoretic  HENT:      Head: Normocephalic and atraumatic  Cardiovascular:      Rate and Rhythm: Normal rate and regular rhythm  Heart sounds: Normal heart sounds  No murmur  No friction rub  No gallop  Pulmonary:      Effort: Pulmonary effort is normal  No respiratory distress  Breath sounds: Normal breath sounds   No stridor  No wheezing, rhonchi or rales  Abdominal:      General: Bowel sounds are normal  There is no distension  Palpations: Abdomen is soft  Tenderness: There is no abdominal tenderness  Musculoskeletal:      Right lower leg: No edema  Left lower leg: No edema  Skin:     General: Skin is warm  Coloration: Skin is not pale  Findings: No erythema or rash  Neurological:      General: No focal deficit present  Mental Status: She is alert  Psychiatric:         Mood and Affect: Mood normal          Behavior: Behavior normal          Thought Content: Thought content normal              Discussion with Family:  I offered to call patient's family, but patient declined  Discharge instructions/Information to patient and family:   See after visit summary for information provided to patient and family  Provisions for Follow-Up Care:  See after visit summary for information related to follow-up care and any pertinent home health orders  Disposition:     Home    For Discharges to [facility] SNF:   · Not Applicable to this Patient - Not Applicable to this Patient    Planned Readmission:  None  Discharge Statement:  I spent 45 minutes discharging the patient  This time was spent on the day of discharge  I had direct contact with the patient on the day of discharge  Greater than 50% of the total time was spent examining patient, answering all patient questions, arranging and discussing plan of care with patient as well as directly providing post-discharge instructions  Additional time then spent on discharge activities  Discharge Medications:  See after visit summary for reconciled discharge medications provided to patient and family        ** Please Note: This note has been constructed using a voice recognition system **

## 2021-02-03 NOTE — ASSESSMENT & PLAN NOTE
· Presented with stabbing chest pain, worsened by movement and laying flat  · R/o ACS vs pericarditis  · FREDO score: 0  · EKG: NSR, T wave abnormalities  · CXR with no acute findings noted  · Follow-up on official results in AM    · Troponin negative x 1    · Continue to trend  · Monitor on telemetry  · Obtain cardiology consult given abnormalities on EKG  · Obtain echocardiogram   · Obtain lipid panel

## 2021-02-03 NOTE — DISCHARGE INSTR - AVS FIRST PAGE
Dear Hitesh Franco,     It was our pleasure to care for you here at [facility]  It is our hope that we were always able to exceed the expected standards for your care during your stay  You were hospitalized due to chest pain (no heart attack); according to cardiologist likely musculoskeletal in origin  You were cared for on the 1st floor under the service of Gideon Reilly MD with the [facility] who covers for your primary care physician (PCP), Baldomero Bernheim, DO, while you were hospitalized  If you have any questions or concerns related to this hospitalization, you may contact us at [phone]  For follow up as well as medication refills, we recommend that you follow up with your primary care physician  A registered nurse will reach out to you by phone within a few days after your discharge to answer any additional questions that you may have after going home  However, at this time we provide for you here, the most important instructions / recommendations at discharge:     · Notable Medication Adjustments -   · None, may continue with your previous medications as prescribed by your doctor  May have Tylenol on as needed basis, for pains  May also have Mylanta on as-needed basis for heartburn  · Testing Required after Discharge -   · None  · Important follow up information -   · Outpatient follow-up with your primary care physician  · Other Instructions -   · Eat nutritious food  · Do not get dehydrated  · Continue social distancing  · Wear mask as needed  · Please review this entire after visit summary as additional general instructions including medication list, appointments, activity, diet, any pertinent wound care, and other additional recommendations from your care team that may be provided for you        Sincerely,     Gideon Reilly MD

## 2021-02-03 NOTE — ASSESSMENT & PLAN NOTE
· Presented with sharp, stabbing chest pain, worsened by movement and lying flat  Patient has had back pain in the left upper back and generalized lower back pain s/p fall x 1 week ago  · Patient was taking prednisone on empty stomach and aleve for pain  · Echo 2018 showed EF 60% and otherwise unremarkable  · 24 hour Holter monitor in 2018 for palpitations and light headness showed rare PACs, PVCs and one episode of 5 beat SVT run  · FH negative for syncope or sudden cardiac death  · FREDO score: 0  · EKG: NSR, Non specific T wave inversions similar to ECG in 2018  · CXR benign  · Troponin negative x 3   · Lipid panel normal   · DDX includes costochondritis vs GERD in the setting of recent prednisone and NSAID use for back pain  · Pain in the ED was completely relieved by GI cocktail  Plan:  · Echo pending  · Continue ASA 81 mg po qd  · Sress test pending due to T wave abnormalities

## 2021-02-03 NOTE — CONSULTS
Consultation - Cardiology   Williams King 45 y o  female MRN: [id number]  Unit/Bed#: ED 17 Encounter: [id number]        Assessment:   Principal Problem:    Chest pain  Active Problems:    History of orthostatic hypotension    Anxiety      Plan:     CRP is normal-no signs of pericarditis on EKG, no abnormal exam findings to suggest pericarditis, troponins are normal therefore no myocarditis    Unlikely ACS, I suspect musculoskeletal, she points to the costochondral junction at the left sternum, was worse with movement, worsened when she laid down, did not improve with aspirin and is now resolved    Considering the abnormal EKG which I cannot explain, even though her overall risk from a cardiac perspective seems low, I would favor trying to have her do a treadmill stress echo today    History of Present Illness   Physician Requesting Consult: Frida Wilson MD  Reason for Consult / Principal Problem:  Chest pain  HPI: Williams King is a 45y o  year old female who presents with back pain for 1 week after a lot of heavy lifting and carrying things from her basement to her attic, and then fell on the ice last week, was trying to stretch yesterday morning when all of a sudden while attempting to stretch her back she had acute abrupt onset of sharp stabbing left sided midsternal pain right at the costochondral junction, pain continued to be worse when she laid down, worse with twisting and bending movements, better when she stood or sat up, is now resolved  EKG was abnormal with inferolateral T-wave inversions which are new from 2018, but troponins are normal, she has very high HDL, normal cholesterol otherwise, no premature family history of CAD  Review of Systems   Constitutional: Negative  HENT: Negative  Eyes: Negative  Respiratory: Negative  Cardiovascular: Positive for chest pain  Negative for palpitations and leg swelling  Gastrointestinal: Negative  Endocrine: Negative      Genitourinary: Negative  Musculoskeletal: Negative  Skin: Negative  Neurological: Negative  Hematological: Negative  Psychiatric/Behavioral: Negative  Historical Information   Past Medical History:   Diagnosis Date    Anxiety     Dermatitis 02/15/2007    LAST ASSESSED: 2/15/07    Low blood pressure     Migraines     Palpitations      Past Surgical History:   Procedure Laterality Date    NO PAST SURGERIES       Social History     Substance and Sexual Activity   Alcohol Use No     Social History     Substance and Sexual Activity   Drug Use No     Social History     Tobacco Use   Smoking Status Never Smoker   Smokeless Tobacco Never Used     Family History   Problem Relation Age of Onset    Kidney cancer Mother 61        Ureter Ca    Hyperlipidemia Mother     Heart attack Maternal Grandmother [de-identified]    Transient ischemic attack Maternal Grandmother     Kidney cancer Maternal Grandfather     Colon cancer Maternal Grandfather [de-identified]    Leukemia Paternal Grandfather     Hypertension Father     Hyperlipidemia Father     Thyroid disease Father     Hypertension Brother     Stroke Brother 40    Transient ischemic attack Paternal Grandmother     Heart disease Paternal Grandmother        Allergies: Allergies   Allergen Reactions    Penicillins Rash       Medications:   Scheduled Meds:  Current Facility-Administered Medications   Medication Dose Route Frequency Provider Last Rate    acetaminophen  650 mg Oral Q6H PRN [address], [place]      aluminum-magnesium hydroxide-simethicone  30 mL Oral Q6H PRN [address], [place]      enoxaparin  40 mg Subcutaneous Daily Traci Montoya PA-C       Continuous Infusions:   PRN Meds:  acetaminophen, 650 mg, Q6H PRN  aluminum-magnesium hydroxide-simethicone, 30 mL, Q6H PRN        Objective:   Vitals:   Vitals:    02/03/21 0753   BP: 120/73   Pulse: 68   Resp: 16   Temp:    SpO2: 98%     There is no height or weight on file to calculate BMI    Orthostatic Blood Pressures Most Recent Value   Blood Pressure  120/73 filed at 02/03/2021 3459   Patient Position - Orthostatic VS  Lying filed at 02/03/2021 1806        Systolic (01OTX), JGT:685 , Min:105 , AGD:263     Diastolic (65NUR), IUS:69, Min:66, Max:97    No intake or output data in the 24 hours ending 02/03/21 1013  Weight (last 2 days)     None        Invasive Devices     Peripheral Intravenous Line            Peripheral IV Right Antecubital -- days                Physical Exam  Constitutional:       General: She is not in acute distress  Appearance: She is well-developed  She is not diaphoretic  HENT:      Head: Normocephalic and atraumatic  Eyes:      General: No scleral icterus  Conjunctiva/sclera: Conjunctivae normal       Pupils: Pupils are equal, round, and reactive to light  Neck:      Musculoskeletal: Normal range of motion and neck supple  Thyroid: No thyromegaly  Vascular: No JVD  Trachea: No tracheal deviation  Cardiovascular:      Rate and Rhythm: Normal rate and regular rhythm  Heart sounds: Normal heart sounds  No murmur  No friction rub  No gallop  Pulmonary:      Effort: Pulmonary effort is normal  No respiratory distress  Breath sounds: Normal breath sounds  No wheezing or rales  Abdominal:      General: Bowel sounds are normal  There is no distension  Palpations: Abdomen is soft  Tenderness: There is no abdominal tenderness  Musculoskeletal: Normal range of motion  General: No tenderness or deformity  Right lower leg: No edema  Left lower leg: No edema  Skin:     General: Skin is warm and dry  Findings: No erythema or rash  Neurological:      Mental Status: She is alert and oriented to person, place, and time  Cranial Nerves: No cranial nerve deficit     Psychiatric:         Judgment: Judgment normal          Labs:     Results from last 7 days   Lab Units 02/03/21  0754 02/03/21  0441 02/03/21  0147   TROPONIN I ng/mL <0 02 <0 02 <0 02     CBC with diff:   Results from last 7 days   Lab Units 21   WBC Thousand/uL 7 29   HEMOGLOBIN g/dL 13 7   HEMATOCRIT % 40 4   MCV fL 91   PLATELETS Thousands/uL 201   MCH pg 30 9   MCHC g/dL 33 9   RDW % 11 9   MPV fL 9 5   NRBC AUTO /100 WBCs 0     CMP:  Results from last 7 days   Lab Units 21   POTASSIUM mmol/L 3 5   CHLORIDE mmol/L 104   CO2 mmol/L 30   BUN mg/dL 17   CREATININE mg/dL 1 03   CALCIUM mg/dL 8 8   AST U/L 16   ALT U/L 22   ALK PHOS U/L 68   EGFR ml/min/1 73sq m 63     NT-proBNP: No results found for: NTBNP   Magnesium:    Coags:    TSH:     Hemoglobin A1C:    Lipid Profile:   No results found for: CHOL  Lab Results   Component Value Date    HDL 86 2021    HDL 82 10/31/2018     Lab Results   Component Value Date    LDLCALC 94 2021    LDLCALC 120 (H) 10/31/2018     Lab Results   Component Value Date    TRIG 34 2021    TRIG 77 10/31/2018       Imaging & Testing   Cardiac testing:   EKG reviewed personally:  Normal sinus rhythm, inferolateral T-wave inversion concerning for ischemia  Telemetry reviewed personally:  Normal sinus rhythm  Results for orders placed in visit on 18   Echo complete with contrast if indicated    Narrative [place] [address], [facility]  [phone]    Transthoracic Echocardiogram  2D, M-mode, Doppler, and Color Doppler    Study date:  2018    Patient: Bradford Mathew  MR number: [id number]  Account number: [de-identified]  : 1970  Age: 49 years  Gender: Female  Status: Outpatient  Location: [facility] [place]  Height: 67 in  Weight: 164 6 lb  BP: 152/ 90 mmHg    Indications: Palpitations    Diagnoses: R00 2 - Palpitations    Sonographer:  SILVANA Vee, RDCS  Referring Physician:  Jenn Dimas MD  Group:  [facility]  Interpreting Physician:  Juan Carlos Summers MD    SUMMARY    LEFT VENTRICLE:  Size was normal   Systolic function was normal  Ejection fraction was estimated to be 60 %  Wall thickness was normal   Left ventricular diastolic function parameters were normal     RIGHT VENTRICLE:  The size was normal   Systolic function was normal     MITRAL VALVE:  There was mild regurgitation  TRICUSPID VALVE:  There was mild regurgitation  HISTORY: PRIOR HISTORY: Dizziness; Palpitations; Orthostatic hypotension    PROCEDURE: The study was performed in the [facility]  This was a routine study  The transthoracic approach was used  The study included complete 2D imaging, M-mode, complete spectral Doppler, and color Doppler  The  heart rate was 70 bpm, at the start of the study  Images were obtained from the parasternal, apical, subcostal, and suprasternal notch acoustic windows  Image quality was good  LEFT VENTRICLE: Size was normal  Systolic function was normal  Ejection fraction was estimated to be 60 %  There were no regional wall motion abnormalities  Wall thickness was normal  DOPPLER: Left ventricular diastolic function parameters  were normal     RIGHT VENTRICLE: The size was normal  Systolic function was normal  Wall thickness was normal     LEFT ATRIUM: Size was normal     RIGHT ATRIUM: Size was normal     MITRAL VALVE: Valve structure was normal  There was normal leaflet separation  DOPPLER: The transmitral velocity was within the normal range  There was no evidence for stenosis  There was mild regurgitation  AORTIC VALVE: The valve was trileaflet  Leaflets exhibited normal thickness and normal cuspal separation  DOPPLER: Transaortic velocity was within the normal range  There was no evidence for stenosis  There was no regurgitation  TRICUSPID VALVE: The valve structure was normal  There was normal leaflet separation  DOPPLER: The transtricuspid velocity was within the normal range  There was no evidence for stenosis  There was mild regurgitation   The tricuspid jet  envelope definition was inadequate for estimation of RV systolic pressure  There are no indirect findings suggestive of moderate or severe pulmonary hypertension  PULMONIC VALVE: Leaflets exhibited normal thickness, no calcification, and normal cuspal separation  DOPPLER: The transpulmonic velocity was within the normal range  There was no regurgitation  PERICARDIUM: There was no pericardial effusion  The pericardium was normal in appearance  AORTA: The root exhibited normal size  SYSTEMIC VEINS: IVC: The inferior vena cava was normal in size and course  Respirophasic changes were normal     SYSTEM MEASUREMENT TABLES    2D  %FS: 37 %  AV Diam: 3 19 cm  EDV(Teich): 77 1 ml  EF(Cube): 74 99 %  EF(Teich): 67 32 %  ESV(Cube): 18 08 ml  ESV(Teich): 25 19 ml  IVSd: 0 96 cm  LA Area: 12 3 cm2  LA Diam: 3 01 cm  LVEDV MOD A4C: 72 69 ml  LVEF MOD A4C: 71 02 %  LVESV MOD A4C: 21 07 ml  LVIDd: 4 17 cm  LVIDs: 2 62 cm  LVLd A4C: 7 34 cm  LVLs A4C: 5 6 cm  LVPWd: 1 04 cm  RA Area: 15 14 cm2  RV Diam: 3 49 cm  SI(Cube): 29 16 ml/m2  SI(Teich): 27 91 ml/m2  SV MOD A4C: 51 62 ml  SV(Cube): 54 24 ml  SV(Teich): 51 91 ml    CW  TR MaxP 92 mmHg  TR Vmax: 2 34 m/s    MM  TAPSE: 1 87 cm    PW  E': 0 1 m/s  E/E': 9 42  MV A Robbi: 0 78 m/s  MV Dec McCreary: 5 77 m/s2  MV DecT: 167 19 ms  MV E Robbi: 0 97 m/s  MV E/A Ratio: 1 24    [employer] Accredited Echocardiography Laboratory    Prepared and electronically signed by    Carole Osorio MD  Signed [id number] 15:58:24       No results found for this or any previous visit  No results found for this or any previous visit  No results found for this or any previous visit  Imaging:   I have personally reviewed pertinent reports  Xr Chest 1 View Portable    Result Date: 2/3/2021  Narrative: CHEST INDICATION:   cp  COMPARISON:  10/4/2016 EXAM PERFORMED/VIEWS:  XR CHEST PORTABLE FINDINGS: Cardiomediastinal silhouette appears unremarkable  The lungs are clear    No pneumothorax or pleural effusion  Osseous structures appear within normal limits for patient age  Impression: No acute cardiopulmonary disease  Workstation performed: [id number]       Laney Talley MD    Portions of the record may have been created with voice recognition software   Occasional wrong word or "sound a like" substitutions may have occurred due to the inherent limitations of voice recognition software   Read the chart carefully and recognize, using context, where substitutions have occurred

## 2021-02-03 NOTE — ASSESSMENT & PLAN NOTE
· Stable  · Not currently taking midodrine  Reports intermittent lightheadedness but denies recent worsening  · BP stable  · Outpatient follow-up with primary care physician

## 2021-02-03 NOTE — ASSESSMENT & PLAN NOTE
· Not currently taking any prescription medications  · Outpatient follow-up with primary care physician

## 2021-02-04 NOTE — UTILIZATION REVIEW
Notification of Observation Admission/Observation Authorization Request   This is a Notification of Observation Admission for David  Be advised that this patient was admitted to our facility under Observation Status  Contact Nik Thomas at [phone] for additional admission information  [facility] DEPT  DEDICATED [phone]  Patient Name:   Roque House   YOB: 1970       [facility] [address]   [facility] [address]:   [address]  Tax ID: [id number]  NPI: [id number] Attending Provider/NPI: Brady Fair Md [[id number]]   Place of Service Code: 25     Place of Service Name:  CPT Code for Observation:  On [address] / CPT [address]   Start Date:      Discharge Date & Time: 2/3/2021  2:35 PM    Type of Admission: Observation Status Discharge Disposition   (if discharged): Home/Self Care   Patient Diagnoses: Chest pain [R07 9]     Orders: Admission Orders (From admission, onward)     Ordered        02/03/21 0029  Place in Observation  Once                    Assigned Utilization Review Contact: Nik Thomas  Utilization   Network Utilization Review Department  Phone: [phone]; Fax [phone]  Email: [email or web address]   ATTENTION PAYERS: Please call the assigned Utilization  directly with any questions or concerns ALL voicemails in the department are confidential  Send all requests for admission clinical reviews, approved or denied determinations and any other requests to dedicated fax number belonging to the campus where the patient is receiving treatment

## 2021-02-05 LAB
ATRIAL RATE: 59 BPM
ATRIAL RATE: 62 BPM
ATRIAL RATE: 65 BPM
ATRIAL RATE: 69 BPM
P AXIS: 59 DEGREES
P AXIS: 61 DEGREES
P AXIS: 64 DEGREES
P AXIS: 9 DEGREES
PR INTERVAL: 112 MS
PR INTERVAL: 130 MS
PR INTERVAL: 134 MS
PR INTERVAL: 142 MS
QRS AXIS: 57 DEGREES
QRS AXIS: 61 DEGREES
QRS AXIS: 62 DEGREES
QRS AXIS: 62 DEGREES
QRSD INTERVAL: 68 MS
QRSD INTERVAL: 68 MS
QRSD INTERVAL: 70 MS
QRSD INTERVAL: 70 MS
QT INTERVAL: 386 MS
QT INTERVAL: 390 MS
QT INTERVAL: 400 MS
QT INTERVAL: 416 MS
QTC INTERVAL: 405 MS
QTC INTERVAL: 406 MS
QTC INTERVAL: 411 MS
QTC INTERVAL: 413 MS
T WAVE AXIS: -10 DEGREES
T WAVE AXIS: -26 DEGREES
T WAVE AXIS: 10 DEGREES
T WAVE AXIS: 10 DEGREES
VENTRICULAR RATE: 59 BPM
VENTRICULAR RATE: 62 BPM
VENTRICULAR RATE: 65 BPM
VENTRICULAR RATE: 69 BPM

## 2021-02-05 PROCEDURE — 93010 ELECTROCARDIOGRAM REPORT: CPT | Performed by: INTERNAL MEDICINE

## 2021-02-05 NOTE — UTILIZATION REVIEW
Notification of Discharge  This is a Notification of Discharge from our facility [address]  Please be advised that this patient has been discharge from our facility  Below you will find the admission and discharge date and time including the patients disposition  PRESENTATION DATE: 2/2/2021  9:51 PM  OBS ADMISSION DATE:   IP ADMISSION DATE: N/A N/A   DISCHARGE DATE: 2/3/2021  2:35 PM  DISPOSITION: Home/Self Care Home/Self Care   Admission Orders listed below:  Admission Orders (From admission, onward)     Ordered        02/03/21 0029  Place in Observation  Once                   Please contact the UR Department if additional information is required to close this patient's authorization/case  [address] [facility] Utilization Review Department  Main: [phone] x carefully listen to the prompts  All voicemails are confidential   [email or web address]  Send all requests for admission clinical reviews, approved or denied determinations and any other requests to dedicated fax number below belonging to the campus where the patient is receiving treatment   List of dedicated fax numbers:  [address] DENIALS (Administrative/Medical Necessity) [phone]   [address] (Maternity/NICU/Pediatrics) [phone]   Ashlee Aguilar [phone]   Maria Elena Ng [phone]   Virginia Briones [phone]   [facility] [address] [place] [address] [phone]   [facility]  [phone]   [address] [facility]  [address] [address] [facility] [phone]

## 2021-02-11 LAB
CHEST PAIN STATEMENT: NORMAL
MAX DIASTOLIC BP: 98 MMHG
MAX HEART RATE: 173 BPM
MAX PREDICTED HEART RATE: 169 BPM
MAX. SYSTOLIC BP: 170 MMHG
PROTOCOL NAME: NORMAL
REASON FOR TERMINATION: NORMAL
TARGET HR FORMULA: NORMAL
TEST INDICATION: NORMAL
TIME IN EXERCISE PHASE: NORMAL

## 2021-12-26 ENCOUNTER — OFFICE VISIT (OUTPATIENT)
Dept: URGENT CARE | Facility: CLINIC | Age: 51
End: 2021-12-26
Payer: COMMERCIAL

## 2021-12-26 VITALS
BODY MASS INDEX: 25.11 KG/M2 | HEART RATE: 111 BPM | OXYGEN SATURATION: 97 % | HEIGHT: 67 IN | TEMPERATURE: 100.9 F | WEIGHT: 160 LBS | RESPIRATION RATE: 16 BRPM

## 2021-12-26 DIAGNOSIS — Z20.822 ENCOUNTER FOR SCREENING LABORATORY TESTING FOR COVID-19 VIRUS: Primary | ICD-10-CM

## 2021-12-26 PROCEDURE — G0383 LEV 4 HOSP TYPE B ED VISIT: HCPCS | Performed by: NURSE PRACTITIONER

## 2021-12-26 PROCEDURE — 87636 SARSCOV2 & INF A&B AMP PRB: CPT | Performed by: NURSE PRACTITIONER

## 2021-12-27 LAB
FLUAV RNA RESP QL NAA+PROBE: NEGATIVE
FLUBV RNA RESP QL NAA+PROBE: NEGATIVE
SARS-COV-2 RNA RESP QL NAA+PROBE: POSITIVE

## 2022-01-03 ENCOUNTER — OFFICE VISIT (OUTPATIENT)
Dept: URGENT CARE | Facility: CLINIC | Age: 52
End: 2022-01-03
Payer: COMMERCIAL

## 2022-01-03 VITALS
TEMPERATURE: 98.5 F | BODY MASS INDEX: 25.11 KG/M2 | WEIGHT: 160 LBS | HEIGHT: 67 IN | HEART RATE: 105 BPM | OXYGEN SATURATION: 98 %

## 2022-01-03 DIAGNOSIS — U07.1 COVID-19: Primary | ICD-10-CM

## 2022-01-03 PROCEDURE — G0382 LEV 3 HOSP TYPE B ED VISIT: HCPCS | Performed by: NURSE PRACTITIONER

## 2022-01-03 NOTE — PROGRESS NOTES
[address] [facility]        NAME: Melodie Gonzalez is a 45 y o  female  : 1970    MRN: [id number]  DATE: January 3, 2022  TIME: 6:15 PM    Assessment and Plan   COVID-19 [U07 1]  1  COVID-19         Vitals stable  Appears in NAD  Lungs clear  Advised to follow up with PCP as this is thought to be related to recent Covid 19 infection  If symptoms worsen, advised to proceed to the ER  Patient Instructions       Follow up with PCP in 3-5 days  Proceed to  ER if symptoms worsen  Chief Complaint     Chief Complaint   Patient presents with    Cough     x 10 days tested Pos for covid, shortness breath         History of Present Illness       Patient is a 62-year-old female presenting with continued cough, shortness of breath, lightheadedness, and itching in her ears for the past 10 days  She was diagnosed with COVID on 2021  She has been taking Mucinex for symptomatic relief  Cough  Associated symptoms include shortness of breath  Pertinent negatives include no ear pain, fever, rhinorrhea or sore throat  Review of Systems   Review of Systems   Constitutional: Negative for activity change and fever  HENT: Negative for congestion, ear discharge, ear pain, rhinorrhea, sinus pain and sore throat  Respiratory: Positive for cough and shortness of breath  Neurological: Positive for light-headedness           Current Medications       Current Outpatient Medications:     acetaminophen (TYLENOL) 325 mg tablet, Take 2 tablets (650 mg total) by mouth every 6 (six) hours as needed for mild pain, headaches or fever, Disp:  , Rfl: 0    aluminum-magnesium hydroxide-simethicone (MYLANTA) 200-200-20 mg/5 mL suspension, Take 30 mL by mouth every 6 (six) hours as needed for indigestion or heartburn, Disp: 355 mL, Rfl: 0    butalbital-acetaminophen-caffeine (FIORICET,ESGIC) -40 mg per tablet, TAKE 1 TABLET BY MOUTH EVERY 6 HOURS AS NEEDED FOR HEADACHE, Disp: , Rfl: 0    Calcium Carbonate-Vitamin D (CALCIUM 500 + D PO), Take by mouth, Disp: , Rfl:     Cholecalciferol (VITAMIN D3) 5000 units TABS, Take 5,000 Units by mouth daily, Disp: , Rfl:     Cyanocobalamin (VITAMIN B-12) 3000 MCG SUBL, Place under the tongue daily, Disp: , Rfl:     Specialty Vitamins Products (MAGNESIUM, AMINO ACID CHELATE,) 133 MG tablet, Take 1 tablet by mouth 2 (two) times a day, Disp: , Rfl:     Current Allergies     Allergies as of 01/03/2022 - Reviewed 01/03/2022   Allergen Reaction Noted    Penicillins Rash 10/04/2016            The following portions of the patient's history were reviewed and updated as appropriate: allergies, current medications, past family history, past medical history, past social history, past surgical history and problem list      Past Medical History:   Diagnosis Date    Anxiety     Dermatitis 02/15/2007    LAST ASSESSED: 2/15/07    Low blood pressure     Migraines     Palpitations        Past Surgical History:   Procedure Laterality Date    NO PAST SURGERIES         Family History   Problem Relation Age of Onset    Kidney cancer Mother 61        Ureter Ca    Hyperlipidemia Mother     Heart attack Maternal Grandmother [de-identified]    Transient ischemic attack Maternal Grandmother     Kidney cancer Maternal Grandfather     Colon cancer Maternal Grandfather [de-identified]    Leukemia Paternal Grandfather     Hypertension Father     Hyperlipidemia Father     Thyroid disease Father     Hypertension Brother     Stroke Brother 40    Transient ischemic attack Paternal Grandmother     Heart disease Paternal Grandmother          Medications have been verified  Objective   Pulse 105   Temp 98 5 °F (36 9 °C)   Ht 5' 7" (1 702 m)   Wt 72 6 kg (160 lb)   SpO2 98%   BMI 25 06 kg/m²        Physical Exam     Physical Exam  Vitals reviewed  Constitutional:       General: She is awake  She is not in acute distress  Appearance: Normal appearance  She is normal weight  HENT:      Head: Normocephalic  Right Ear: Hearing, tympanic membrane, ear canal and external ear normal       Left Ear: Hearing, tympanic membrane, ear canal and external ear normal       Nose: Nose normal    Cardiovascular:      Rate and Rhythm: Normal rate and regular rhythm  Heart sounds: Normal heart sounds, S1 normal and S2 normal    Pulmonary:      Effort: Pulmonary effort is normal       Breath sounds: Normal breath sounds  No decreased breath sounds, wheezing, rhonchi or rales  Skin:     General: Skin is warm and moist    Neurological:      General: No focal deficit present  Mental Status: She is alert and oriented to person, place, and time  Psychiatric:         Behavior: Behavior is cooperative

## 2022-01-05 ENCOUNTER — TELEMEDICINE (OUTPATIENT)
Dept: FAMILY MEDICINE CLINIC | Facility: CLINIC | Age: 52
End: 2022-01-05
Payer: COMMERCIAL

## 2022-01-05 VITALS — BODY MASS INDEX: 25.11 KG/M2 | TEMPERATURE: 97.1 F | HEIGHT: 67 IN | WEIGHT: 160 LBS

## 2022-01-05 DIAGNOSIS — U07.1 COVID-19: ICD-10-CM

## 2022-01-05 DIAGNOSIS — Z76.89 ENCOUNTER TO ESTABLISH CARE: Primary | ICD-10-CM

## 2022-01-05 DIAGNOSIS — R05.9 COUGH: ICD-10-CM

## 2022-01-05 DIAGNOSIS — R09.81 NASAL CONGESTION: ICD-10-CM

## 2022-01-05 PROCEDURE — 99213 OFFICE O/P EST LOW 20 MIN: CPT | Performed by: FAMILY MEDICINE

## 2022-01-05 PROCEDURE — 3008F BODY MASS INDEX DOCD: CPT | Performed by: FAMILY MEDICINE

## 2022-01-05 PROCEDURE — 1036F TOBACCO NON-USER: CPT | Performed by: FAMILY MEDICINE

## 2022-01-05 PROCEDURE — 3725F SCREEN DEPRESSION PERFORMED: CPT | Performed by: FAMILY MEDICINE

## 2022-01-05 RX ORDER — BENZONATATE 100 MG/1
100 CAPSULE ORAL 3 TIMES DAILY PRN
Qty: 20 CAPSULE | Refills: 0 | Status: SHIPPED | OUTPATIENT
Start: 2022-01-05

## 2022-01-05 NOTE — PROGRESS NOTES
COVID-19 Outpatient Progress Note    Assessment/Plan:  Problem List Items Addressed This Visit     None      Visit Diagnoses     Encounter to establish care    -  Primary    COVID-19      - s/s since 12/25/2021  - tested COVID POS on 12/26/2021  - UTD with COVID IMMs but due for Booster  - cool mist humidifier, Mucinex, saline nasal spray, Vicks, and will eRx Tessalon Perles   - f/u PRN - pt aware and agreeable       Cough        Relevant Medications    benzonatate (TESSALON PERLES) 100 mg capsule    Nasal congestion               Disposition:     Patient is fully vaccinated and I recommended self quarantine for 5 days followed by strict mask use for an additional 5 days  If patient were to develop symptoms, they should immediately self isolate and call our office for further guidance  I have spent 15 minutes directly with the patient  Greater than 50% of this time was spent in counseling/coordination of care regarding: diagnostic results, prognosis, risks and benefits of treatment options, instructions for management, patient and family education, importance of treatment compliance, risk factor reductions and impressions  Encounter provider Doyle Palma DO  Provider located at [address] [place], [place] [id number]    Recent Visits  No visits were found meeting these conditions  Showing recent visits within past 7 days and meeting all other requirements  Today's Visits  Date Type Provider Dept   01/05/22 Telemedicine DO Clovis Mcpherson   Showing today's visits and meeting all other requirements  Future Appointments  No visits were found meeting these conditions  Showing future appointments within next 150 days and meeting all other requirements     This virtual check-in was done via [employer] and patient was informed that this is a secure, HIPAA-compliant platform  She agrees to proceed      Patient agrees to participate in a virtual check in via telephone or video visit instead of presenting to the office to address urgent/immediate medical needs  Patient is aware this is a billable service  After connecting through [facility], the patient was identified by name and date of birth  Gunner Peck was informed that this was a telemedicine visit and that the exam was being conducted confidentially over secure lines  My office door was closed  No one else was in the room  Gunner Peck acknowledged consent and understanding of privacy and security of the telemedicine visit  I informed the patient that I have reviewed her record in Epic and presented the opportunity for her to ask any questions regarding the visit today  The patient agreed to participate  Verification of patient location:  Patient is located in the following state in which I hold an active license: [place]    Subjective:   Gunner Peck is a 45 y o  female who is concerned about COVID-19  Patient's symptoms include nasal congestion, sore throat and cough  Patient denies fever, chills, nausea, vomiting and myalgias       Date of symptom onset: 12/25/2021  COVID-19 vaccination status: Fully vaccinated (primary series)    Exposure:   Contact with a person who is under investigation (PUI) for or who is positive for COVID-19 within the last 14 days?: No    Hospitalized recently for fever and/or lower respiratory symptoms?: No      Currently a healthcare worker that is involved in direct patient care?: No      Works in a special setting where the risk of COVID-19 transmission may be high? (this may include long-term care, correctional and [facility] facilities; homeless shelters; assisted-living facilities and group homes ): No      Resident in a special setting where the risk of COVID-19 transmission may be high? (this may include long-term care, correctional and [facility] facilities; homeless shelters; assisted-living facilities and group homes ): No      - UTD with [address] - due for Booster  - s/s started 12/25/2021 and pt tested COVID POS on 12/26/2021   - (+) cough, severe congestion, irritated ears, sore throat   - has been doing Tylenol and Mucinex   - "cough really bad at night"   - wakes pt up while sleeping - "as if choking"     Lab Results   Component Value Date    SARSCOV2 Positive (A) 12/26/2021     Past Medical History:   Diagnosis Date    Anxiety     Dermatitis 02/15/2007    LAST ASSESSED: 2/15/07    Low blood pressure     Migraines     Palpitations      Past Surgical History:   Procedure Laterality Date    NO PAST SURGERIES       Current Outpatient Medications   Medication Sig Dispense Refill    acetaminophen (TYLENOL) 325 mg tablet Take 2 tablets (650 mg total) by mouth every 6 (six) hours as needed for mild pain, headaches or fever  0    aluminum-magnesium hydroxide-simethicone (MYLANTA) 200-200-20 mg/5 mL suspension Take 30 mL by mouth every 6 (six) hours as needed for indigestion or heartburn 355 mL 0    butalbital-acetaminophen-caffeine (FIORICET,ESGIC) -40 mg per tablet TAKE 1 TABLET BY MOUTH EVERY 6 HOURS AS NEEDED FOR HEADACHE  0    Calcium Carbonate-Vitamin D (CALCIUM 500 + D PO) Take by mouth      Cholecalciferol (VITAMIN D3) 5000 units TABS Take 5,000 Units by mouth daily      Cyanocobalamin (VITAMIN B-12) 3000 MCG SUBL Place under the tongue daily      Specialty Vitamins Products (MAGNESIUM, AMINO ACID CHELATE,) 133 MG tablet Take 1 tablet by mouth 2 (two) times a day      benzonatate (TESSALON PERLES) 100 mg capsule Take 1 capsule (100 mg total) by mouth 3 (three) times a day as needed for cough 20 capsule 0     No current facility-administered medications for this visit  Allergies   Allergen Reactions    Penicillins Rash       Review of Systems   Constitutional: Negative for chills and fever  HENT: Positive for congestion and sore throat  Respiratory: Positive for cough  Gastrointestinal: Negative for nausea and vomiting  Musculoskeletal: Negative for myalgias  Objective:  Vitals:    01/05/22 1119   Temp: (!) 97 1 °F (36 2 °C)   Weight: 72 6 kg (160 lb)   Height: 5' 7" (1 702 m)     Physical Exam  General: AAOx3, NAD  HEENT: NC/AT, EOMI, clear conjunctiva, nml external ear and nose   Cardio: deferred  Pulm: no acute respiratory distress, able to talk in complete sentences w/o getting short of breath, (+) cough and nasal congestion   Abd: deferred   Psych: nml mood/affect/behavior       BMI Counseling: Body mass index is 25 06 kg/m²  The BMI is above normal  Nutrition recommendations include consuming healthier snacks  Exercise recommendations include exercising 3-5 times per week  Depression Screening Follow-up Plan: Patient's depression screening was positive with a PHQ-2 score of 0  Their PHQ-9 score was 0  Clinically patient does not have depression  No treatment is required  VIRTUAL VISIT DISCLAIMER    Unique Gerard verbally agrees to participate in [employer]  Pt is aware that [employer] could be limited without vital signs or the ability to perform a full hands-on physical Cleda Litter understands she or the provider may request at any time to terminate the video visit and request the patient to seek care or treatment in person

## 2023-04-03 ENCOUNTER — OFFICE VISIT (OUTPATIENT)
Dept: PHYSICAL THERAPY | Facility: CLINIC | Age: 53
End: 2023-04-03

## 2023-04-03 DIAGNOSIS — M54.50 ACUTE MIDLINE LOW BACK PAIN WITHOUT SCIATICA: Primary | ICD-10-CM

## 2023-04-03 NOTE — PROGRESS NOTES
PT Evaluation     Today's date: 4/3/2023  Patient name: Lynne Antonio  : 1970  MRN: [id number]  Referring provider: John Leone PT  Dx:   Encounter Diagnosis     ICD-10-CM    1  Acute midline low back pain without sciatica  M54 49           Start Time: 745  Stop Time: 08  Total time in clinic (min): 35 minutes    Assessment  Assessment details: Lynne Antonio is a pleasant 47 y o  female who presents with acute low back pain for about 3 weeks duration following a bathroom remodel project  The patient's greatest concerns are the pain he is experiencing, worry over not knowing what's wrong, concern at no signs of improvement, fear of not being able to keep active and future ill health (and wanting to prevent it)  No further referral appears necessary at this time based upon examination results  Primary movement impairment diagnosis of lumbar hypomobility with extension deficit and poor posture resulting in pathoanatomical symptoms of pain with prolonged positions, mainly sitting, and limiting her ability to drive, get out of a chair, go to work, lift, perform household chores, perform yard work, sit, squat to  objects from the floor, stand and walk  Primary Impairments:  1) lumbar hypomobility   2) lumbar extension deficit   3) poor posture    Etiologic factors include repetitive poor body mechanics, repetitive poor lifting mechanics and poor ergonomics  Impairments: abnormal muscle firing, abnormal or restricted ROM, abnormal movement, activity intolerance, lacks appropriate home exercise program, pain with function, poor posture  and poor body mechanics    Symptom irritability: moderateUnderstanding of Dx/Px/POC: good   Prognosis: good  Prognosis details: Positive prognostic indicators include positive attitude toward recovery, good understanding of diagnosis and treatment plan options, acuity of symptoms, absence of peripheralization and absence of observed red flags  Negative prognostic indicators include high symptom irritability and 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  Goals  (STG) Impairment Goals 4-6 weeks  - Decrease pain to <2/10 at baseline with prolonged positions   - Improve lumbar AROM to 100% throughout and pain-free  - Maintain appropriate posture when seated without cue  - Compliant with lumbar roll use     (LTG) Functional Goals 6-8 weeks  - Return to Prior Level of Function  - Increase Functional Status Measure (FOTO) to: >predicted outcome  - Patient will be independent with HEP  - Patient will be able to sit without increased pain/compensation/difficulty  - Patient will be able to perform sit to stand without increased pain/compensation/difficulty   - Patient will be able to bend forward without increased pain/compensation/difficulty   - Patient will demonstrate appropriate lifting mechanics without cue      Plan  Patient would benefit from: skilled physical therapy  Planned modality interventions: Modalities PRN  Planned therapy interventions: activity modification, manual therapy, neuromuscular re-education, patient education, therapeutic activities, therapeutic exercise, graded activity, home exercise program, behavior modification, self care, abdominal trunk stabilization, body mechanics training, flexibility, functional ROM exercises, stretching, strengthening and joint mobilization  Frequency: 1-2x/week  Duration in weeks: 6  Treatment plan discussed with: patient        Subjective Evaluation    History of Present Illness  Mechanism of injury: WORK/SCHOOL: Office job, 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   PLOF: Does have a hx of chronic low back pain, but mostly muscular in the past   HISTORY OF CURRENT INJURY: Patient reports that 3 weeks ago she re-did her bathroom and had some soreness  About 2 weeks later, she noticed some increase in low back pain and rested   Took her dog for a hike the next day and began "to notice she was having a hard time lifting her R leg up while walking  The next morning when she woke up she couldn't move was in so much pain  Had a korin hard time straightening up  Went to urgent care this past Tuesday but was having a very hard time straightening up after siting  PAIN LOCATION/DESCRIPTORS: central low back (nothing radiating into buttocks or legs)  Described not as muscular, \"feels structural\"  AGGRAVATING FACTORS: sitting for too long (the worst), standing/walking for too long  EASES: muscle relaxer/ibuprofen/advil    IMAGING: none    Daquan Alicia denies a new onset of Bladder incontinence, Bowel dysfunction, Ataxia, Recent unexplained weight loss, Clumsy or unsteadiness, Constant night pain, History of cancer, Tingling, Numbness and Saddle anesthesia   PATIENT GOALS: Reduce pain maintain active     Pain  Current pain ratin  At best pain ratin  At worst pain ratin  Quality: throbbing, tight and pressure  Relieving factors: relaxation, rest, medications, change in position and heat  Aggravating factors: sitting and standing  Progression: improved    Treatments  Previous treatment: medication  Patient Goals  Patient goals for therapy: decreased pain          Objective     Postural Observations  Seated posture: poor    Additional Postural Observation Details  Standing posture: slightly forward flexed with very minimal lateral shift to the R  Stands vs sits to reduce pain  Palpation   Left   Tenderness of the erector spinae and lumbar paraspinals  Right   Tenderness of the erector spinae and lumbar paraspinals       Neurological Testing     Sensation     Lumbar   Left   Intact: light touch    Right   Intact: light touch    Reflexes   Left   Patellar (L4): normal (2+)  Achilles (S1): normal (2+)    Right   Patellar (L4): normal (2+)  Achilles (S1): normal (2+)    Active Range of Motion     Lumbar   Flexion:  with pain Restriction level: moderate  Extension:  with pain Restriction " level: minimal  Left lateral flexion:  Restriction level: minimal  Right lateral flexion:  Restriction level: minimal  Left rotation:  Restriction level: minimal  Right rotation:  Restriction level: minimal    Additional Active Range of Motion Details  Hip IR > 35* andrey     Joint Play     Hypomobile: L1, L2, L3, L4 and L5     Pain: L1, L2, L3, L4 and L5   Mechanical Assessment    Cervical      Thoracic    Lying extension: repeated movements  Pain intensity: better  Pain level: decreased    Lumbar      Strength/Myotome Testing     Lumbar   Left   Heel walk: normal  Toe walk: normal    Right   Heel walk: normal  Toe walk: normal    Left Hip   Planes of Motion   Flexion: 4+    Right Hip   Planes of Motion   Flexion: 4+    Left Knee   Flexion: 5  Extension: 5    Right Knee   Flexion: 5  Extension: 5    Left Ankle/Foot   Dorsiflexion: 5  Plantar flexion: 5  Great toe extension: 5    Right Ankle/Foot   Dorsiflexion: 5  Plantar flexion: 5  Great toe extension: 5    Tests     Lumbar     Left   Negative crossed SLR, passive SLR and slump test      Right   Negative crossed SLR, passive SLR and slump test      Left Pelvic Girdle/Sacrum   Positive: active SLR test      Right Pelvic Girdle/Sacrum   Positive: active SLR test      Left Hip   Negative TOREY and FADIR  Right Hip   Negative TOREY and FADIR                Diagnosis: Acute low back pain    Precautions: none   Primary Goals: extension preference, seated posture, lumbar mobility   *asterisks by exercise = given for HEP   Manuals 4/3       Gr 5 lumbar roll LB: andrey        Prone PA mobs        Prone press ups with OP                        There Ex        Elliptical         Bridge with abd brace        Clamshell with abd brace        paloff press        KB carries                                        Neuro Re-Ed        Prone press ups        TrA activation        Glute activation                                                                          Education Extension preference, lumbar support        Re-evaluation              Ther Act             Lifting mechanics              Hip hinge             Modalities             Heat PRN

## 2023-04-07 ENCOUNTER — OFFICE VISIT (OUTPATIENT)
Dept: PHYSICAL THERAPY | Facility: CLINIC | Age: 53
End: 2023-04-07

## 2023-04-07 DIAGNOSIS — M54.50 ACUTE MIDLINE LOW BACK PAIN WITHOUT SCIATICA: Primary | ICD-10-CM

## 2023-04-07 NOTE — PROGRESS NOTES
"Daily Note     Today's date: 2023  Patient name: Luis Ortiz  : 1970  MRN: [id number]  Referring provider: Lynn Franco PT  Dx:   Encounter Diagnosis     ICD-10-CM    1  Acute midline low back pain without sciatica  M54 49           Start Time: 745  Stop Time: 08  Total time in clinic (min): 45 minutes    Subjective: Patient reports that she continues to feel better each day and that today was the first morning that she was able to get a lot done without really thinking about her back  She still has difficulty with prolonged sitting and does feel stiffness by the end of the day  Objective: See treatment diary below      Assessment: Tolerated treatment well  Patient exhibited good technique with therapeutic exercises and would benefit from continued PT  Patient was able to demonstrate an improvement in lumbar mobility this session, with less pain during PA pressure and able to tolerate introduction of PA mobs  She continues to benefit from mobilizations  Was able to introduce some core and functional strengthening with good tolerance and updated HEP  Will continue to progress as able  Plan: Continue per plan of care  Progress treatment as tolerated         Diagnosis: Acute low back pain    Precautions: none   Primary Goals: extension preference, seated posture, lumbar mobility   *asterisks by exercise = given for HEP   Manuals 4/3 4/7      Gr 5 lumbar roll LB: andrey  LB: andrey       Prone PA mobs  LB: gr 3      Prone press ups with OP                        There Ex        Elliptical / TM  TM 5 mins      Bridge with abd brace  GTB at knees, 3\" 2x10      Clamshell with abd brace  GTB 2x10 3\"       paloff press        KB carries        X-walks  RTB 2 laps                              Neuro Re-Ed        Prone press ups  10x       TrA activation        Glute activation                                                                          Education Extension preference, lumbar support " "Updated HEP       Re-evaluation              Ther Act             Lifting mechanics              Hip hinge   10# 10x to 12\" step         Modalities             Heat PRN                                             "

## 2023-04-21 ENCOUNTER — APPOINTMENT (OUTPATIENT)
Dept: PHYSICAL THERAPY | Facility: CLINIC | Age: 53
End: 2023-04-21

## 2023-04-28 ENCOUNTER — APPOINTMENT (OUTPATIENT)
Dept: PHYSICAL THERAPY | Facility: CLINIC | Age: 53
End: 2023-04-28

## 2024-04-30 RX ORDER — VIT C/B6/B5/MAGNESIUM/HERB 173 50-5-6-5MG
CAPSULE ORAL
COMMUNITY

## 2024-04-30 RX ORDER — 7-OXODEHYDROEPIANDROSTERONE,MC 100 %
POWDER (GRAM) MISCELLANEOUS
COMMUNITY

## 2024-04-30 RX ORDER — FOLIC ACID 0.8 MG
TABLET ORAL
COMMUNITY

## 2024-04-30 RX ORDER — CALCIUM CARBONATE/VITAMIN D3 500-10/5ML
LIQUID (ML) ORAL
COMMUNITY

## 2024-04-30 RX ORDER — GUAIFENESIN/EPHEDRINE HCL 200-12.5MG
TABLET ORAL
COMMUNITY

## 2024-05-01 ENCOUNTER — OFFICE VISIT (OUTPATIENT)
Dept: FAMILY MEDICINE CLINIC | Facility: CLINIC | Age: 54
End: 2024-05-01

## 2024-05-01 VITALS
HEART RATE: 84 BPM | RESPIRATION RATE: 16 BRPM | OXYGEN SATURATION: 98 % | HEIGHT: 66 IN | BODY MASS INDEX: 26.03 KG/M2 | WEIGHT: 162 LBS | SYSTOLIC BLOOD PRESSURE: 140 MMHG | DIASTOLIC BLOOD PRESSURE: 80 MMHG

## 2024-05-01 DIAGNOSIS — Z00.00 ENCOUNTER FOR ANNUAL PHYSICAL EXAM: Primary | ICD-10-CM

## 2024-05-01 DIAGNOSIS — Q76.6 ABNORMAL PROMINENCE OF RIB: ICD-10-CM

## 2024-05-01 DIAGNOSIS — F41.9 ANXIETY: ICD-10-CM

## 2024-05-01 PROBLEM — D16.7: Status: ACTIVE | Noted: 2024-05-01

## 2024-05-01 PROBLEM — D16.7: Status: RESOLVED | Noted: 2024-05-01 | Resolved: 2024-05-01

## 2024-05-01 NOTE — ASSESSMENT & PLAN NOTE
-Reassured the patient that there is a small area of prominence at the costal cartilage margin in the area of the seventh-eighth ribs on the left-hand side.  This is painless    -I would simply observe this.  Chondrosarcoma is usually enlarging and painful prominent mass and is extremely rare

## 2024-05-01 NOTE — ASSESSMENT & PLAN NOTE
Patient needs to schedule annual physical examination with her regular PCP.  I did provide order for screening blood work.  She is overdue for colon cancer screening, breast cancer screening and cervical cancer screening

## 2024-05-01 NOTE — PROGRESS NOTES
Subjective:      Patient ID: Marquita Camacho is a 53 y.o. female.    53-year-old female presents to the office complaining of painless lump at the left upper quadrant of the abdomen which she just noticed.  Patient noticed this a few days ago.  She states that the left side does not feel like the right side.  Not painful.  She had gained some weight and then was dieting and taking probiotics and when she felt her abdomen she noticed a lump.  Patient also request screening blood work.  Patient has not had a physical examination since 2019.  Overdue for physical, colon cancer screening, mammography        Past Medical History:   Diagnosis Date   • Anxiety    • Dermatitis 02/15/2007    LAST ASSESSED: 2/15/07   • Low blood pressure    • Migraines    • Palpitations        Family History   Problem Relation Age of Onset   • Kidney cancer Mother 60        [place]   • Hyperlipidemia Mother    • Heart attack Maternal Grandmother 80   • Transient ischemic attack Maternal Grandmother    • Kidney cancer Maternal Grandfather    • Colon cancer Maternal Grandfather 80   • Leukemia Paternal Grandfather    • Hypertension Father    • Hyperlipidemia Father    • Thyroid disease Father    • Hypertension Brother    • Stroke Brother 44   • Transient ischemic attack Paternal Grandmother    • Heart disease Paternal Grandmother        Past Surgical History:   Procedure Laterality Date   • NO PAST SURGERIES          reports that she has never smoked. She has never used smokeless tobacco. She reports that she does not drink alcohol and does not use drugs.      Current Outpatient Medications:   •  5-Hydroxytryptophan (5-HTP) 100 MG CAPS, , Disp: , Rfl:   •  7-Keto DHEA POWD, , Disp: , Rfl:   •  B Complex Vitamins (B COMPLEX 1 PO), , Disp: , Rfl:   •  butalbital-acetaminophen-caffeine (FIORICET,ESGIC) -40 mg per tablet, TAKE 1 TABLET BY MOUTH EVERY 6 HOURS AS NEEDED FOR HEADACHE, Disp: , Rfl: 0  •  Cholecalciferol (VITAMIN D3) 5000 units  "TABS, Take 5,000 Units by mouth daily, Disp: , Rfl:   •  Elderberry 500 MG CAPS, , Disp: , Rfl:   •  Magnesium 500 MG CAPS, , Disp: , Rfl:   •  Probiotic Product ([employer] ADULT 50+ PROBIOTIC PO), , Disp: , Rfl:   •  Turmeric 500 MG CAPS, , Disp: , Rfl:   •  Zinc 30 MG CAPS, , Disp: , Rfl:     The following portions of the patient's history were reviewed and updated as appropriate: allergies, current medications, past family history, past medical history, past social history, past surgical history and problem list.    Review of Systems   Constitutional: Negative.    Gastrointestinal:  Negative for abdominal distention, abdominal pain, constipation, nausea and vomiting.        Painless lump in the left upper quadrant           Objective:    /80   Pulse 84   Resp 16   Ht 5' 6\" (1.676 m)   Wt 73.5 kg (162 lb)   SpO2 98%   BMI 26.15 kg/m²      Physical Exam  Vitals and nursing note reviewed.   Constitutional:       Appearance: Normal appearance. She is normal weight.   Abdominal:      General: Abdomen is flat. Bowel sounds are normal.      Palpations: Abdomen is soft.      Hernia: No hernia is present.       Neurological:      Mental Status: She is alert.           No results found for this or any previous visit (from the past 1008 hour(s)).    Assessment/Plan:    Abnormal prominence of rib  -Reassured the patient that there is a small area of prominence at the costal cartilage margin in the area of the seventh-eighth ribs on the left-hand side.  This is painless    -I would simply observe this.  Chondrosarcoma is usually enlarging and painful prominent mass and is extremely rare    Encounter for annual physical exam  Patient needs to schedule annual physical examination with her regular PCP.  I did provide order for screening blood work.  She is overdue for colon cancer screening, breast cancer screening and cervical cancer screening          Problem List Items Addressed This Visit        Behavioral Health    " Anxiety    Relevant Orders    Vitamin D 25 hydroxy       Orthopedic/Musculoskeletal    Abnormal prominence of rib     -Reassured the patient that there is a small area of prominence at the costal cartilage margin in the area of the seventh-eighth ribs on the left-hand side.  This is painless    -I would simply observe this.  Chondrosarcoma is usually enlarging and painful prominent mass and is extremely rare            Other    Encounter for annual physical exam - Primary     Patient needs to schedule annual physical examination with her regular PCP.  I did provide order for screening blood work.  She is overdue for colon cancer screening, breast cancer screening and cervical cancer screening         Relevant Orders    CBC and differential    Comprehensive metabolic panel    Lipid panel    TSH, 3rd generation with Free T4 reflex    Vitamin D 25 hydroxy

## 2024-05-15 LAB
25(OH)D3+25(OH)D2 SERPL-MCNC: 63.2 NG/ML (ref 30–100)
ALBUMIN SERPL-MCNC: 4.8 G/DL (ref 3.8–4.9)
ALBUMIN/GLOB SERPL: 2 {RATIO} (ref 1.2–2.2)
ALP SERPL-CCNC: 78 IU/L (ref 44–121)
ALT SERPL-CCNC: 14 IU/L (ref 0–32)
AST SERPL-CCNC: 20 IU/L (ref 0–40)
BASOPHILS # BLD AUTO: 0 X10E3/UL (ref 0–0.2)
BASOPHILS NFR BLD AUTO: 1 %
BILIRUB SERPL-MCNC: 0.4 MG/DL (ref 0–1.2)
BUN SERPL-MCNC: 14 MG/DL (ref 6–24)
BUN/CREAT SERPL: 12 (ref 9–23)
CALCIUM SERPL-MCNC: 10.1 MG/DL (ref 8.7–10.2)
CHLORIDE SERPL-SCNC: 102 MMOL/L (ref 96–106)
CHOLEST SERPL-MCNC: 252 MG/DL (ref 100–199)
CO2 SERPL-SCNC: 28 MMOL/L (ref 20–29)
CREAT SERPL-MCNC: 1.19 MG/DL (ref 0.57–1)
EGFR: 54 ML/MIN/1.73
EOSINOPHIL # BLD AUTO: 0.1 X10E3/UL (ref 0–0.4)
EOSINOPHIL NFR BLD AUTO: 3 %
ERYTHROCYTE [DISTWIDTH] IN BLOOD BY AUTOMATED COUNT: 12.7 % (ref 11.7–15.4)
GLOBULIN SER-MCNC: 2.4 G/DL (ref 1.5–4.5)
GLUCOSE SERPL-MCNC: 93 MG/DL (ref 70–99)
HCT VFR BLD AUTO: 43.5 % (ref 34–46.6)
HDLC SERPL-MCNC: 91 MG/DL
HGB BLD-MCNC: 14.9 G/DL (ref 11.1–15.9)
IMM GRANULOCYTES # BLD: 0 X10E3/UL (ref 0–0.1)
IMM GRANULOCYTES NFR BLD: 0 %
LDLC SERPL CALC-MCNC: 149 MG/DL (ref 0–99)
LYMPHOCYTES # BLD AUTO: 1 X10E3/UL (ref 0.7–3.1)
LYMPHOCYTES NFR BLD AUTO: 28 %
MCH RBC QN AUTO: 31 PG (ref 26.6–33)
MCHC RBC AUTO-ENTMCNC: 34.3 G/DL (ref 31.5–35.7)
MCV RBC AUTO: 90 FL (ref 79–97)
MONOCYTES # BLD AUTO: 0.4 X10E3/UL (ref 0.1–0.9)
MONOCYTES NFR BLD AUTO: 11 %
NEUTROPHILS # BLD AUTO: 2 X10E3/UL (ref 1.4–7)
NEUTROPHILS NFR BLD AUTO: 57 %
PLATELET # BLD AUTO: 203 X10E3/UL (ref 150–450)
POTASSIUM SERPL-SCNC: 5.1 MMOL/L (ref 3.5–5.2)
PROT SERPL-MCNC: 7.2 G/DL (ref 6–8.5)
RBC # BLD AUTO: 4.81 X10E6/UL (ref 3.77–5.28)
SL AMB VLDL CHOLESTEROL CALC: 12 MG/DL (ref 5–40)
SODIUM SERPL-SCNC: 142 MMOL/L (ref 134–144)
T4 FREE SERPL-MCNC: 1.18 NG/DL (ref 0.82–1.77)
TRIGL SERPL-MCNC: 71 MG/DL (ref 0–149)
TSH SERPL DL<=0.005 MIU/L-ACNC: 2.01 UIU/ML (ref 0.45–4.5)
WBC # BLD AUTO: 3.4 X10E3/UL (ref 3.4–10.8)

## 2024-05-28 ENCOUNTER — OFFICE VISIT (OUTPATIENT)
Dept: FAMILY MEDICINE CLINIC | Facility: CLINIC | Age: 54
End: 2024-05-28
Payer: COMMERCIAL

## 2024-05-28 VITALS
SYSTOLIC BLOOD PRESSURE: 130 MMHG | RESPIRATION RATE: 16 BRPM | DIASTOLIC BLOOD PRESSURE: 78 MMHG | HEIGHT: 66 IN | BODY MASS INDEX: 25.71 KG/M2 | WEIGHT: 160 LBS | OXYGEN SATURATION: 98 % | HEART RATE: 63 BPM

## 2024-05-28 DIAGNOSIS — Z12.4 CERVICAL CANCER SCREENING: ICD-10-CM

## 2024-05-28 DIAGNOSIS — Z12.31 ENCOUNTER FOR SCREENING MAMMOGRAM FOR MALIGNANT NEOPLASM OF BREAST: ICD-10-CM

## 2024-05-28 DIAGNOSIS — Z12.11 SCREENING FOR COLON CANCER: ICD-10-CM

## 2024-05-28 DIAGNOSIS — Z80.51 FAMILY HISTORY OF RENAL CANCER: ICD-10-CM

## 2024-05-28 DIAGNOSIS — R79.89 ELEVATED SERUM CREATININE: ICD-10-CM

## 2024-05-28 DIAGNOSIS — Z00.00 ANNUAL PHYSICAL EXAM: Primary | ICD-10-CM

## 2024-05-28 DIAGNOSIS — E78.00 ELEVATED LDL CHOLESTEROL LEVEL: ICD-10-CM

## 2024-05-28 DIAGNOSIS — Q76.6 ABNORMAL PROMINENCE OF RIB: ICD-10-CM

## 2024-05-28 DIAGNOSIS — Z83.438 FAMILY HISTORY OF HYPERLIPIDEMIA: ICD-10-CM

## 2024-05-28 PROCEDURE — 99213 OFFICE O/P EST LOW 20 MIN: CPT | Performed by: FAMILY MEDICINE

## 2024-05-28 PROCEDURE — 99396 PREV VISIT EST AGE 40-64: CPT | Performed by: FAMILY MEDICINE

## 2024-05-28 NOTE — PROGRESS NOTES
Assessment/Plan:   Diagnoses and all orders for this visit:    Annual physical exam  - reviewed PMHx, PSHx, meds, allergies, Fhx, Soc Hx and Sexual Hx  - UTD with COVID IMMs but no Booster  - UTD with Tdap (2022)   - reviewed labs done 5/2024  - declined STD screening in the office today   - per pt, UTD with annual GYN exam, Cervical Ca and Breast Ca screening - follows with Ob/Gyn in [place] but would like to establish closer - referral given   - overdue for Colon Ca screening - options d/w pt and referred to GI for screening Cscope   - UTD with Dental   - overdue for Optho and advised to schedule   - RTO in 1yr for annual - pt aware and agreeable    Elevated serum creatinine  -     Basic metabolic panel; Future  - noted to have elevated serum crt and low GFR -- prior labs have been stable   - will repeat labs in 1month - pt aware and agreeable     Elevated LDL cholesterol level  -    - (+) Fhx of HL in both parents   - diet/exercise   - caution with cheese intake   - repeat annually   - The 10-year ASCVD risk score (Eugene DK, et al., 2019) is: 1.6%    Values used to calculate the score:      Age: 54 years      Sex: Female      Is Non- : No      Diabetic: No      Tobacco smoker: No      Systolic Blood Pressure: 130 mmHg      Is BP treated: No      HDL Cholesterol: 91 mg/dL      Total Cholesterol: 252 mg/dL  Family history of hyperlipidemia    Family history of renal cancer  -     Ambulatory Referral to Urology; Future    Screening for colon cancer  -     Ambulatory Referral to Gastroenterology; Future    Encounter for screening mammogram for malignant neoplasm of breast  Cervical cancer screening  -     Ambulatory Referral to Obstetrics / Gynecology; Future    Abnormal prominence of rib   - cont to monitor           Subjective:    Patient ID: Marquita Camacho is a 54 y.o. female.  53yo F presents to the office for annual exam   - PMHx: Hypotesion, Migraine/Lightheadedness, Palpitations and  "PVCs, Anemia, DODIE, combined allergic/non-allergic rhinitis   - allergies: PCN - rash, Ragweed   - Meds: see med rec   - PSHx: wisdom teeth   - Fhx: M (HL, renal/ureter ca - reoccurrence), F (HL, HTN, thyroid disease), B (HTN, stroke at 43), MGF (renal ca), P/MGM (TIA), PGF (leukemia); no Fhx of autoimmune disorders   - Immunizations: UTD with COVID IMMs but no Boosters, UTD with Tdap (2022)   - GYN Hx: follows with specialist in [place] - per pt, UTD with annual, Cervical Ca and Breast Ca screening   - Hm: overdue for Colon Ca screening  - diet/exercise: not much exercise, diet: eliminating sugar/salt, high protein diet, \"eating \"  - social: denies Tob/illicits/EtOH; 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    Review of Systems  as per HPI    Objective:  /78   Pulse 63   Resp 16   Ht 5' 6\" (1.676 m)   Wt 72.6 kg (160 lb)   SpO2 98%   BMI 25.82 kg/m²    Physical Exam  Vitals reviewed.   Constitutional:       General: She is not in acute distress.     Appearance: Normal appearance. She is not ill-appearing, toxic-appearing or diaphoretic.   HENT:      Head: Normocephalic and atraumatic.      Right Ear: External ear normal.      Left Ear: External ear normal.      Nose: Nose normal.   Eyes:      General: No scleral icterus.        Right eye: No discharge.         Left eye: No discharge.      Extraocular Movements: Extraocular movements intact.      Conjunctiva/sclera: Conjunctivae normal.   Cardiovascular: "      Rate and Rhythm: Normal rate and regular rhythm.      Heart sounds: Normal heart sounds.   Pulmonary:      Effort: Pulmonary effort is normal. No respiratory distress.      Breath sounds: Normal breath sounds. No stridor. No wheezing, rhonchi or rales.   Abdominal:      Palpations: Abdomen is soft.   Musculoskeletal:         General: Normal range of motion.      Cervical back: Normal range of motion.      Right lower leg: No edema.      Left lower leg: No edema.      Comments: Slight prominence of lower rib on L-side, non-tender   Skin:     General: Skin is warm.   Neurological:      General: No focal deficit present.      Mental Status: She is alert and oriented to person, place, and time.   Psychiatric:         Mood and Affect: Mood normal.         Behavior: Behavior normal.

## 2024-06-03 ENCOUNTER — PREP FOR PROCEDURE (OUTPATIENT)
Age: 54
End: 2024-06-03

## 2024-06-03 ENCOUNTER — TELEPHONE (OUTPATIENT)
Age: 54
End: 2024-06-03

## 2024-06-03 DIAGNOSIS — Z12.11 SCREENING FOR COLON CANCER: Primary | ICD-10-CM

## 2024-06-03 NOTE — TELEPHONE ENCOUNTER
Scheduled date of colonoscopy (as of today): 8/16/24  Physician performing colonoscopy: ABHISHEK  Location of colonoscopy:  [facility]  Bowel prep reviewed with patient: jailyn/sofi  Instructions to [facility]     06/03/24  Screened by: Tiffanie Edwards    Referring Provider     Pre- Screening:     There is no height or weight on file to calculate BMI. 25.82  Has patient been referred for a routine screening Colonoscopy? yes  Is the patient between 45-75 years old? yes      Previous Colonoscopy yes   If yes:    Date: 15 yrs    Facility:     Reason:     Does the patient want to see a Gastroenterologist prior to their procedure OR are they having any GI symptoms? no    Has the patient been hospitalized or had abdominal surgery in the past 6 months? no    Does the patient use supplemental oxygen? no    Does the patient take Coumadin, Lovenox, Plavix, Elliquis, Xarelto, or other blood thinning medication? no    Has the patient had a stroke, cardiac event, or stent placed in the past year? no      If patient is between 45yrs - 49yrs, please advise patient that we will have to confirm benefits & coverage with their insurance company for a routine screening colonoscopy.

## 2025-02-19 ENCOUNTER — OFFICE VISIT (OUTPATIENT)
Dept: OBGYN CLINIC | Facility: CLINIC | Age: 55
End: 2025-02-19
Payer: COMMERCIAL

## 2025-02-19 VITALS
HEIGHT: 66 IN | WEIGHT: 166 LBS | DIASTOLIC BLOOD PRESSURE: 70 MMHG | SYSTOLIC BLOOD PRESSURE: 112 MMHG | BODY MASS INDEX: 26.68 KG/M2

## 2025-02-19 DIAGNOSIS — Z12.31 ENCOUNTER FOR SCREENING MAMMOGRAM FOR MALIGNANT NEOPLASM OF BREAST: ICD-10-CM

## 2025-02-19 DIAGNOSIS — Z12.4 CERVICAL CANCER SCREENING: ICD-10-CM

## 2025-02-19 DIAGNOSIS — Z12.39 ENCOUNTER FOR SCREENING BREAST EXAMINATION: ICD-10-CM

## 2025-02-19 DIAGNOSIS — Z78.0 POSTMENOPAUSAL: ICD-10-CM

## 2025-02-19 DIAGNOSIS — Z12.11 SCREEN FOR COLON CANCER: ICD-10-CM

## 2025-02-19 DIAGNOSIS — Z01.419 WELL WOMAN EXAM WITH ROUTINE GYNECOLOGICAL EXAM: ICD-10-CM

## 2025-02-19 DIAGNOSIS — Z12.4 ENCOUNTER FOR SCREENING FOR MALIGNANT NEOPLASM OF CERVIX: ICD-10-CM

## 2025-02-19 DIAGNOSIS — Z01.419 ENCOUNTER FOR WELL WOMAN EXAM: Primary | ICD-10-CM

## 2025-02-19 DIAGNOSIS — Z11.51 SCREENING FOR HPV (HUMAN PAPILLOMAVIRUS): ICD-10-CM

## 2025-02-19 PROCEDURE — G0476 HPV COMBO ASSAY CA SCREEN: HCPCS | Performed by: PHYSICIAN ASSISTANT

## 2025-02-19 PROCEDURE — G0145 SCR C/V CYTO,THINLAYER,RESCR: HCPCS | Performed by: PHYSICIAN ASSISTANT

## 2025-02-19 PROCEDURE — 99386 PREV VISIT NEW AGE 40-64: CPT | Performed by: PHYSICIAN ASSISTANT

## 2025-02-19 NOTE — PROGRESS NOTES
":  Assessment & Plan  Encounter for well woman exam         Encounter for screening breast examination         Cervical cancer screening         Encounter for screening for malignant neoplasm of cervix    Orders:    Liquid-based pap, screening    Screening for HPV (human papillomavirus)    Orders:    Liquid-based pap, screening    Postmenopausal         Encounter for screening mammogram for malignant neoplasm of breast    Orders:    Mammo screening bilateral w 3d and cad; Future    Well woman exam with routine gynecological exam    Orders:    Liquid-based pap, screening    Screen for colon cancer    Orders:    Ambulatory Referral to Gastroenterology; Future        History of Present Illness     Marquita Camacho is a 54 y.o. female   Pt presents as a new patient for her annual exam today--  She has no gyn complaints  She has no bleeding or pelvic pain  Bowel and bladder are regular  Colonoscopy--rx given  No breast concerns today  Last mammo--rx given    pap today.    Rx mammo  Rx colon  Daily ca, d      Review of Systems   Constitutional:  Negative for chills, fever and unexpected weight change.   HENT:  Negative for ear pain and sore throat.    Eyes:  Negative for pain and visual disturbance.   Respiratory:  Negative for cough and shortness of breath.    Cardiovascular:  Negative for chest pain and palpitations.   Gastrointestinal:  Negative for abdominal pain, blood in stool, constipation, diarrhea and vomiting.   Genitourinary: Negative.  Negative for dysuria and hematuria.   Musculoskeletal:  Negative for arthralgias and back pain.   Skin:  Negative for color change and rash.   Neurological:  Negative for seizures and syncope.   All other systems reviewed and are negative.    Objective   /70 (BP Location: Left arm, Patient Position: Sitting, Cuff Size: Standard)   Ht 5' 6\" (1.676 m)   Wt 75.3 kg (166 lb)   LMP 11/20/2018 (Approximate)   BMI 26.79 kg/m²      Physical Exam  Vitals and nursing note reviewed. "   Constitutional:       General: She is not in acute distress.     Appearance: Normal appearance. She is well-developed.   HENT:      Head: Normocephalic and atraumatic.   Eyes:      Conjunctiva/sclera: Conjunctivae normal.   Cardiovascular:      Rate and Rhythm: Normal rate and regular rhythm.      Heart sounds: No murmur heard.  Pulmonary:      Effort: Pulmonary effort is normal. No respiratory distress.      Breath sounds: Normal breath sounds.   Abdominal:      Palpations: Abdomen is soft.      Tenderness: There is no abdominal tenderness.   Genitourinary:     General: Normal vulva.      Uterus: Normal.       Adnexa: Right adnexa normal and left adnexa normal.   Musculoskeletal:         General: No swelling.      Cervical back: Neck supple.   Skin:     General: Skin is warm and dry.      Capillary Refill: Capillary refill takes less than 2 seconds.   Neurological:      Mental Status: She is alert.   Psychiatric:         Mood and Affect: Mood normal.

## 2025-02-20 LAB
HPV HR 12 DNA CVX QL NAA+PROBE: NEGATIVE
HPV16 DNA CVX QL NAA+PROBE: NEGATIVE
HPV18 DNA CVX QL NAA+PROBE: NEGATIVE

## 2025-02-26 LAB
LAB AP GYN PRIMARY INTERPRETATION: NORMAL
Lab: NORMAL

## 2025-02-27 DIAGNOSIS — R92.30 DENSE BREASTS: Primary | ICD-10-CM

## 2025-03-03 ENCOUNTER — RESULTS FOLLOW-UP (OUTPATIENT)
Dept: OBGYN CLINIC | Facility: CLINIC | Age: 55
End: 2025-03-03